# Patient Record
Sex: FEMALE | Race: WHITE | Employment: UNEMPLOYED | URBAN - NONMETROPOLITAN AREA
[De-identification: names, ages, dates, MRNs, and addresses within clinical notes are randomized per-mention and may not be internally consistent; named-entity substitution may affect disease eponyms.]

---

## 2019-01-01 ENCOUNTER — APPOINTMENT (OUTPATIENT)
Dept: CT IMAGING | Age: 84
DRG: 853 | End: 2019-01-01
Attending: INTERNAL MEDICINE
Payer: MEDICARE

## 2019-01-01 ENCOUNTER — ANESTHESIA EVENT (OUTPATIENT)
Dept: OPERATING ROOM | Age: 84
DRG: 853 | End: 2019-01-01
Payer: MEDICARE

## 2019-01-01 ENCOUNTER — ANESTHESIA (OUTPATIENT)
Dept: OPERATING ROOM | Age: 84
DRG: 853 | End: 2019-01-01
Payer: MEDICARE

## 2019-01-01 ENCOUNTER — APPOINTMENT (OUTPATIENT)
Dept: GENERAL RADIOLOGY | Age: 84
DRG: 853 | End: 2019-01-01
Attending: INTERNAL MEDICINE
Payer: MEDICARE

## 2019-01-01 ENCOUNTER — HOSPITAL ENCOUNTER (INPATIENT)
Age: 84
LOS: 1 days | DRG: 853 | End: 2019-06-03
Attending: INTERNAL MEDICINE | Admitting: INTERNAL MEDICINE
Payer: MEDICARE

## 2019-01-01 VITALS
RESPIRATION RATE: 2 BRPM | TEMPERATURE: 98.1 F | DIASTOLIC BLOOD PRESSURE: 101 MMHG | OXYGEN SATURATION: 98 % | SYSTOLIC BLOOD PRESSURE: 146 MMHG

## 2019-01-01 VITALS
BODY MASS INDEX: 33.13 KG/M2 | HEIGHT: 66 IN | HEART RATE: 115 BPM | WEIGHT: 206.13 LBS | OXYGEN SATURATION: 82 % | RESPIRATION RATE: 45 BRPM | SYSTOLIC BLOOD PRESSURE: 88 MMHG | DIASTOLIC BLOOD PRESSURE: 69 MMHG | TEMPERATURE: 96.1 F

## 2019-01-01 LAB
ADENOVIRUS F 40 41 PCR: NOT DETECTED
ALBUMIN SERPL-MCNC: 2.9 G/DL (ref 3.5–5.1)
ALLEN TEST: ABNORMAL
ALP BLD-CCNC: 44 U/L (ref 38–126)
ALT SERPL-CCNC: 11 U/L (ref 11–66)
AMORPHOUS: ABNORMAL
ANION GAP SERPL CALCULATED.3IONS-SCNC: 17 MEQ/L (ref 8–16)
ANION GAP SERPL CALCULATED.3IONS-SCNC: 18 MEQ/L (ref 8–16)
ANION GAP SERPL CALCULATED.3IONS-SCNC: 18 MEQ/L (ref 8–16)
ANION GAP SERPL CALCULATED.3IONS-SCNC: 25 MEQ/L (ref 8–16)
AST SERPL-CCNC: 21 U/L (ref 5–40)
ASTROVIRUS PCR: NOT DETECTED
AVERAGE GLUCOSE: 90 MG/DL (ref 70–126)
BACTERIA: ABNORMAL
BASE EXCESS (CALCULATED): -13.5 MMOL/L (ref -2.5–2.5)
BASOPHILS # BLD: 0 %
BASOPHILS # BLD: 0.3 %
BASOPHILS ABSOLUTE: 0 THOU/MM3 (ref 0–0.1)
BASOPHILS ABSOLUTE: 0.1 THOU/MM3 (ref 0–0.1)
BILIRUB SERPL-MCNC: 0.4 MG/DL (ref 0.3–1.2)
BILIRUBIN URINE: NEGATIVE
BLOOD, URINE: ABNORMAL
BUN BLDV-MCNC: 51 MG/DL (ref 7–22)
BUN BLDV-MCNC: 58 MG/DL (ref 7–22)
BUN BLDV-MCNC: 60 MG/DL (ref 7–22)
BUN BLDV-MCNC: 61 MG/DL (ref 7–22)
CALCIUM IONIZED: 0.96 MMOL/L (ref 1.12–1.32)
CALCIUM IONIZED: 0.96 MMOL/L (ref 1.12–1.32)
CALCIUM IONIZED: 0.99 MMOL/L (ref 1.12–1.32)
CALCIUM SERPL-MCNC: 7.2 MG/DL (ref 8.5–10.5)
CALCIUM SERPL-MCNC: 7.2 MG/DL (ref 8.5–10.5)
CALCIUM SERPL-MCNC: 7.4 MG/DL (ref 8.5–10.5)
CALCIUM SERPL-MCNC: 8.3 MG/DL (ref 8.5–10.5)
CAMPYLOBACTER PCR: NOT DETECTED
CASTS: ABNORMAL /LPF
CHARACTER, URINE: ABNORMAL
CHLORIDE BLD-SCNC: 104 MEQ/L (ref 98–111)
CHLORIDE BLD-SCNC: 106 MEQ/L (ref 98–111)
CHLORIDE BLD-SCNC: 106 MEQ/L (ref 98–111)
CHLORIDE BLD-SCNC: 108 MEQ/L (ref 98–111)
CLOSTRIDIUM DIFFICILE, PCR: NOT DETECTED
CO2: 14 MEQ/L (ref 23–33)
CO2: 14 MEQ/L (ref 23–33)
CO2: 16 MEQ/L (ref 23–33)
CO2: 9 MEQ/L (ref 23–33)
COLLECTED BY:: ABNORMAL
COLOR: YELLOW
CREAT SERPL-MCNC: 2.5 MG/DL (ref 0.4–1.2)
CREAT SERPL-MCNC: 3.2 MG/DL (ref 0.4–1.2)
CREAT SERPL-MCNC: 3.8 MG/DL (ref 0.4–1.2)
CREAT SERPL-MCNC: 4.2 MG/DL (ref 0.4–1.2)
CREATININE URINE: 72.4 MG/DL
CRYPTOSPORIDIUM PCR: NOT DETECTED
CRYSTALS: ABNORMAL
CYCLOSPORA CAYETANENSIS PCR: NOT DETECTED
DEVICE: ABNORMAL
DIFFERENTIAL TYPE: ABNORMAL
DIFFERENTIAL, MANUAL: NORMAL
E COLI 0157 PCR: ABNORMAL
E COLI ENTEROAGGREGATIVE PCR: NOT DETECTED
E COLI ENTEROPATHOGENIC PCR: DETECTED
E COLI ENTEROTOXIGENIC PCR: NOT DETECTED
E COLI SHIGA LIKE TOXIN PCR: NOT DETECTED
E COLI SHIGELLA/ENTEROINVASIVE PCR: NOT DETECTED
E HISTOLYTICA GI FILM ARRAY: NOT DETECTED
EOSINOPHIL # BLD: 0 %
EOSINOPHIL # BLD: 0 %
EOSINOPHILS ABSOLUTE: 0 THOU/MM3 (ref 0–0.4)
EOSINOPHILS ABSOLUTE: 0 THOU/MM3 (ref 0–0.4)
EPITHELIAL CELLS, UA: ABNORMAL /HPF
ERYTHROCYTE [DISTWIDTH] IN BLOOD BY AUTOMATED COUNT: 12.8 % (ref 11.5–14.5)
ERYTHROCYTE [DISTWIDTH] IN BLOOD BY AUTOMATED COUNT: 13.1 % (ref 11.5–14.5)
ERYTHROCYTE [DISTWIDTH] IN BLOOD BY AUTOMATED COUNT: 13.1 % (ref 11.5–14.5)
ERYTHROCYTE [DISTWIDTH] IN BLOOD BY AUTOMATED COUNT: 48.5 FL (ref 35–45)
ERYTHROCYTE [DISTWIDTH] IN BLOOD BY AUTOMATED COUNT: 50.2 FL (ref 35–45)
ERYTHROCYTE [DISTWIDTH] IN BLOOD BY AUTOMATED COUNT: 53.9 FL (ref 35–45)
FOLATE: 16.6 NG/ML (ref 4.8–24.2)
GFR SERPL CREATININE-BSD FRML MDRD: 10 ML/MIN/1.73M2
GFR SERPL CREATININE-BSD FRML MDRD: 11 ML/MIN/1.73M2
GFR SERPL CREATININE-BSD FRML MDRD: 14 ML/MIN/1.73M2
GFR SERPL CREATININE-BSD FRML MDRD: 18 ML/MIN/1.73M2
GIARDIA LAMBLIA PCR: NOT DETECTED
GLUCOSE BLD-MCNC: 158 MG/DL (ref 70–108)
GLUCOSE BLD-MCNC: 169 MG/DL (ref 70–108)
GLUCOSE BLD-MCNC: 174 MG/DL (ref 70–108)
GLUCOSE BLD-MCNC: 185 MG/DL (ref 70–108)
GLUCOSE BLD-MCNC: 185 MG/DL (ref 70–108)
GLUCOSE BLD-MCNC: 192 MG/DL (ref 70–108)
GLUCOSE BLD-MCNC: 235 MG/DL (ref 70–108)
GLUCOSE, URINE: NEGATIVE MG/DL
GLUCOSE, WHOLE BLOOD: 263 MG/DL (ref 70–108)
HBA1C MFR BLD: 5 % (ref 4.4–6.4)
HCO3: 11 MMOL/L (ref 23–28)
HCT VFR BLD CALC: 46.1 % (ref 37–47)
HCT VFR BLD CALC: 50.7 % (ref 37–47)
HCT VFR BLD CALC: 51.7 % (ref 37–47)
HCT VFR BLD CALC: 54.8 % (ref 37–47)
HEMOGLOBIN: 14.6 GM/DL (ref 12–16)
HEMOGLOBIN: 16.3 GM/DL (ref 12–16)
HEMOGLOBIN: 16.4 GM/DL (ref 12–16)
HEMOGLOBIN: 16.9 GM/DL (ref 12–16)
IFIO2: 4
IMMATURE GRANS (ABS): 0.56 THOU/MM3 (ref 0–0.07)
IMMATURE GRANULOCYTES: 2.4 %
KETONES, URINE: NEGATIVE
LACTIC ACID, SEPSIS: 3.1 MMOL/L (ref 0.5–1.9)
LACTIC ACID, SEPSIS: 4 MMOL/L (ref 0.5–1.9)
LACTIC ACID: 3 MMOL/L (ref 0.5–2.2)
LACTIC ACID: 3.1 MMOL/L (ref 0.5–2.2)
LACTIC ACID: 3.7 MMOL/L (ref 0.5–2.2)
LACTIC ACID: 4.6 MMOL/L (ref 0.5–2.2)
LACTIC ACID: 4.7 MMOL/L (ref 0.5–2.2)
LACTIC ACID: 5.4 MMOL/L (ref 0.5–2.2)
LACTIC ACID: 9.2 MMOL/L (ref 0.5–2.2)
LEUKOCYTE ESTERASE, URINE: ABNORMAL
LYMPHOCYTES # BLD: 3 %
LYMPHOCYTES # BLD: 4.5 %
LYMPHOCYTES ABSOLUTE: 0.8 THOU/MM3 (ref 1–4.8)
LYMPHOCYTES ABSOLUTE: 1.1 THOU/MM3 (ref 1–4.8)
MACROCYTES: PRESENT
MAGNESIUM: 2 MG/DL (ref 1.6–2.4)
MCH RBC QN AUTO: 33.1 PG (ref 26–33)
MCH RBC QN AUTO: 33.2 PG (ref 26–33)
MCH RBC QN AUTO: 34.1 PG (ref 26–33)
MCHC RBC AUTO-ENTMCNC: 30.8 GM/DL (ref 32.2–35.5)
MCHC RBC AUTO-ENTMCNC: 31.7 GM/DL (ref 32.2–35.5)
MCHC RBC AUTO-ENTMCNC: 32.1 GM/DL (ref 32.2–35.5)
MCV RBC AUTO: 103 FL (ref 81–99)
MCV RBC AUTO: 104.8 FL (ref 81–99)
MCV RBC AUTO: 110.7 FL (ref 81–99)
MONOCYTES # BLD: 19 %
MONOCYTES # BLD: 7.9 %
MONOCYTES ABSOLUTE: 1.9 THOU/MM3 (ref 0.4–1.3)
MONOCYTES ABSOLUTE: 4.8 THOU/MM3 (ref 0.4–1.3)
MRSA SCREEN RT-PCR: NEGATIVE
NITRITE, URINE: NEGATIVE
NOROVIRUS GI GII PCR: NOT DETECTED
NUCLEATED RED BLOOD CELLS: 0 /100 WBC
NUCLEATED RED BLOOD CELLS: 0 /100 WBC
O2 SATURATION: 95 %
PATHOLOGIST REVIEW: ABNORMAL
PCO2: 23 MMHG (ref 35–45)
PH BLOOD GAS: 7.28 (ref 7.35–7.45)
PH UA: 5.5 (ref 5–9)
PHOSPHORUS: 6.2 MG/DL (ref 2.4–4.7)
PLATELET # BLD: 248 THOU/MM3 (ref 130–400)
PLATELET # BLD: 288 THOU/MM3 (ref 130–400)
PLATELET # BLD: 290 THOU/MM3 (ref 130–400)
PLESIOMONAS SHIGELLOIDES PCR: NOT DETECTED
PMV BLD AUTO: 10 FL (ref 9.4–12.4)
PMV BLD AUTO: 10 FL (ref 9.4–12.4)
PMV BLD AUTO: 9.8 FL (ref 9.4–12.4)
PO2: 82 MMHG (ref 71–104)
POTASSIUM REFLEX MAGNESIUM: 4.9 MEQ/L (ref 3.5–5.2)
POTASSIUM REFLEX MAGNESIUM: 5 MEQ/L (ref 3.5–5.2)
POTASSIUM SERPL-SCNC: 4.8 MEQ/L (ref 3.5–5.2)
POTASSIUM SERPL-SCNC: 5.9 MEQ/L (ref 3.5–5.2)
PROTEIN UA: >= 300 MG/DL
RBC # BLD: 4.4 MILL/MM3 (ref 4.2–5.4)
RBC # BLD: 4.92 MILL/MM3 (ref 4.2–5.4)
RBC # BLD: 4.95 MILL/MM3 (ref 4.2–5.4)
RBC URINE: ABNORMAL /HPF
RENAL EPITHELIAL, UA: ABNORMAL
ROTAVIRUS A PCR: NOT DETECTED
SALMONELLA PCR: NOT DETECTED
SAPOVIRUS PCR: NOT DETECTED
SEG NEUTROPHILS: 78 %
SEG NEUTROPHILS: 84.9 %
SEGMENTED NEUTROPHILS ABSOLUTE COUNT: 19.9 THOU/MM3 (ref 1.8–7.7)
SEGMENTED NEUTROPHILS ABSOLUTE COUNT: 20.1 THOU/MM3 (ref 1.8–7.7)
SODIUM BLD-SCNC: 136 MEQ/L (ref 135–145)
SODIUM BLD-SCNC: 137 MEQ/L (ref 135–145)
SODIUM BLD-SCNC: 140 MEQ/L (ref 135–145)
SODIUM BLD-SCNC: 142 MEQ/L (ref 135–145)
SODIUM URINE: 54 MEQ/L
SOURCE, BLOOD GAS: ABNORMAL
SPECIFIC GRAVITY UA: 1.02 (ref 1–1.03)
TOTAL PROTEIN: 5.1 G/DL (ref 6.1–8)
UROBILINOGEN, URINE: 0.2 EU/DL (ref 0–1)
VANCOMYCIN RESISTANT ENTEROCOCCUS: NEGATIVE
VIBRIO CHOLERAE PCR: NOT DETECTED
VIBRIO PCR: NOT DETECTED
VITAMIN B-12: 360 PG/ML (ref 211–911)
WBC # BLD: 23 THOU/MM3 (ref 4.8–10.8)
WBC # BLD: 23.7 THOU/MM3 (ref 4.8–10.8)
WBC # BLD: 25.5 THOU/MM3 (ref 4.8–10.8)
WBC UA: ABNORMAL /HPF
YEAST: ABNORMAL
YERSINIA ENTEROCOLITICA PCR: NOT DETECTED

## 2019-01-01 PROCEDURE — 82803 BLOOD GASES ANY COMBINATION: CPT

## 2019-01-01 PROCEDURE — P9045 ALBUMIN (HUMAN), 5%, 250 ML: HCPCS | Performed by: REGISTERED NURSE

## 2019-01-01 PROCEDURE — 87507 IADNA-DNA/RNA PROBE TQ 12-25: CPT

## 2019-01-01 PROCEDURE — 2580000003 HC RX 258: Performed by: INTERNAL MEDICINE

## 2019-01-01 PROCEDURE — 99223 1ST HOSP IP/OBS HIGH 75: CPT | Performed by: INTERNAL MEDICINE

## 2019-01-01 PROCEDURE — 82607 VITAMIN B-12: CPT

## 2019-01-01 PROCEDURE — 6360000002 HC RX W HCPCS: Performed by: SURGERY

## 2019-01-01 PROCEDURE — 6370000000 HC RX 637 (ALT 250 FOR IP): Performed by: NURSE PRACTITIONER

## 2019-01-01 PROCEDURE — 81001 URINALYSIS AUTO W/SCOPE: CPT

## 2019-01-01 PROCEDURE — 85025 COMPLETE CBC W/AUTO DIFF WBC: CPT

## 2019-01-01 PROCEDURE — 36415 COLL VENOUS BLD VENIPUNCTURE: CPT

## 2019-01-01 PROCEDURE — 6370000000 HC RX 637 (ALT 250 FOR IP): Performed by: SURGERY

## 2019-01-01 PROCEDURE — 3700000000 HC ANESTHESIA ATTENDED CARE: Performed by: SURGERY

## 2019-01-01 PROCEDURE — 2709999900 HC NON-CHARGEABLE SUPPLY

## 2019-01-01 PROCEDURE — 6360000002 HC RX W HCPCS: Performed by: INTERNAL MEDICINE

## 2019-01-01 PROCEDURE — 82948 REAGENT STRIP/BLOOD GLUCOSE: CPT

## 2019-01-01 PROCEDURE — 99291 CRITICAL CARE FIRST HOUR: CPT | Performed by: INTERNAL MEDICINE

## 2019-01-01 PROCEDURE — 6370000000 HC RX 637 (ALT 250 FOR IP): Performed by: INTERNAL MEDICINE

## 2019-01-01 PROCEDURE — 3700000001 HC ADD 15 MINUTES (ANESTHESIA): Performed by: SURGERY

## 2019-01-01 PROCEDURE — 88342 IMHCHEM/IMCYTCHM 1ST ANTB: CPT

## 2019-01-01 PROCEDURE — 84100 ASSAY OF PHOSPHORUS: CPT

## 2019-01-01 PROCEDURE — 87075 CULTR BACTERIA EXCEPT BLOOD: CPT

## 2019-01-01 PROCEDURE — 7100000000 HC PACU RECOVERY - FIRST 15 MIN: Performed by: SURGERY

## 2019-01-01 PROCEDURE — 87040 BLOOD CULTURE FOR BACTERIA: CPT

## 2019-01-01 PROCEDURE — P9047 ALBUMIN (HUMAN), 25%, 50ML: HCPCS | Performed by: INTERNAL MEDICINE

## 2019-01-01 PROCEDURE — 83735 ASSAY OF MAGNESIUM: CPT

## 2019-01-01 PROCEDURE — 85014 HEMATOCRIT: CPT

## 2019-01-01 PROCEDURE — 71045 X-RAY EXAM CHEST 1 VIEW: CPT

## 2019-01-01 PROCEDURE — 2500000003 HC RX 250 WO HCPCS: Performed by: INTERNAL MEDICINE

## 2019-01-01 PROCEDURE — 2720000010 HC SURG SUPPLY STERILE: Performed by: SURGERY

## 2019-01-01 PROCEDURE — 99223 1ST HOSP IP/OBS HIGH 75: CPT | Performed by: SURGERY

## 2019-01-01 PROCEDURE — 02HV33Z INSERTION OF INFUSION DEVICE INTO SUPERIOR VENA CAVA, PERCUTANEOUS APPROACH: ICD-10-PCS | Performed by: INTERNAL MEDICINE

## 2019-01-01 PROCEDURE — 80048 BASIC METABOLIC PNL TOTAL CA: CPT

## 2019-01-01 PROCEDURE — 87500 VANOMYCIN DNA AMP PROBE: CPT

## 2019-01-01 PROCEDURE — 88309 TISSUE EXAM BY PATHOLOGIST: CPT

## 2019-01-01 PROCEDURE — 2500000003 HC RX 250 WO HCPCS

## 2019-01-01 PROCEDURE — 87081 CULTURE SCREEN ONLY: CPT

## 2019-01-01 PROCEDURE — 2780000010 HC IMPLANT OTHER: Performed by: SURGERY

## 2019-01-01 PROCEDURE — 84300 ASSAY OF URINE SODIUM: CPT

## 2019-01-01 PROCEDURE — 0DBF0ZZ EXCISION OF RIGHT LARGE INTESTINE, OPEN APPROACH: ICD-10-PCS | Performed by: SURGERY

## 2019-01-01 PROCEDURE — 83605 ASSAY OF LACTIC ACID: CPT

## 2019-01-01 PROCEDURE — 2700000000 HC OXYGEN THERAPY PER DAY

## 2019-01-01 PROCEDURE — 44160 REMOVAL OF COLON: CPT | Performed by: SURGERY

## 2019-01-01 PROCEDURE — 2580000003 HC RX 258: Performed by: SURGERY

## 2019-01-01 PROCEDURE — 85027 COMPLETE CBC AUTOMATED: CPT

## 2019-01-01 PROCEDURE — 82746 ASSAY OF FOLIC ACID SERUM: CPT

## 2019-01-01 PROCEDURE — 82570 ASSAY OF URINE CREATININE: CPT

## 2019-01-01 PROCEDURE — 2000000000 HC ICU R&B

## 2019-01-01 PROCEDURE — 3600000014 HC SURGERY LEVEL 4 ADDTL 15MIN: Performed by: SURGERY

## 2019-01-01 PROCEDURE — 2580000003 HC RX 258: Performed by: NURSE PRACTITIONER

## 2019-01-01 PROCEDURE — 36600 WITHDRAWAL OF ARTERIAL BLOOD: CPT

## 2019-01-01 PROCEDURE — 6360000002 HC RX W HCPCS: Performed by: NURSE PRACTITIONER

## 2019-01-01 PROCEDURE — 80053 COMPREHEN METABOLIC PANEL: CPT

## 2019-01-01 PROCEDURE — 2500000003 HC RX 250 WO HCPCS: Performed by: SURGERY

## 2019-01-01 PROCEDURE — 2500000003 HC RX 250 WO HCPCS: Performed by: REGISTERED NURSE

## 2019-01-01 PROCEDURE — 85018 HEMOGLOBIN: CPT

## 2019-01-01 PROCEDURE — 36556 INSERT NON-TUNNEL CV CATH: CPT

## 2019-01-01 PROCEDURE — 7100000001 HC PACU RECOVERY - ADDTL 15 MIN: Performed by: SURGERY

## 2019-01-01 PROCEDURE — C1751 CATH, INF, PER/CENT/MIDLINE: HCPCS

## 2019-01-01 PROCEDURE — 3600000004 HC SURGERY LEVEL 4 BASE: Performed by: SURGERY

## 2019-01-01 PROCEDURE — 3209999900 CT INTERPRETATION OF OUTSIDE IMAGES

## 2019-01-01 PROCEDURE — 82330 ASSAY OF CALCIUM: CPT

## 2019-01-01 PROCEDURE — 36556 INSERT NON-TUNNEL CV CATH: CPT | Performed by: INTERNAL MEDICINE

## 2019-01-01 PROCEDURE — 82947 ASSAY GLUCOSE BLOOD QUANT: CPT

## 2019-01-01 PROCEDURE — 6360000002 HC RX W HCPCS: Performed by: REGISTERED NURSE

## 2019-01-01 PROCEDURE — 88341 IMHCHEM/IMCYTCHM EA ADD ANTB: CPT

## 2019-01-01 PROCEDURE — 87641 MR-STAPH DNA AMP PROBE: CPT

## 2019-01-01 PROCEDURE — 99024 POSTOP FOLLOW-UP VISIT: CPT | Performed by: SURGERY

## 2019-01-01 PROCEDURE — 87205 SMEAR GRAM STAIN: CPT

## 2019-01-01 PROCEDURE — 87070 CULTURE OTHR SPECIMN AEROBIC: CPT

## 2019-01-01 PROCEDURE — 94760 N-INVAS EAR/PLS OXIMETRY 1: CPT

## 2019-01-01 PROCEDURE — C1729 CATH, DRAINAGE: HCPCS | Performed by: SURGERY

## 2019-01-01 PROCEDURE — 83036 HEMOGLOBIN GLYCOSYLATED A1C: CPT

## 2019-01-01 PROCEDURE — 2709999900 HC NON-CHARGEABLE SUPPLY: Performed by: SURGERY

## 2019-01-01 DEVICE — SEALANT HEMSTAT 5ML HUM FIBRIN THROM 2 VI APPL DEV EVICEL: Type: IMPLANTABLE DEVICE | Status: FUNCTIONAL

## 2019-01-01 RX ORDER — ACETAMINOPHEN 325 MG/1
650 TABLET ORAL EVERY 4 HOURS PRN
Status: DISCONTINUED | OUTPATIENT
Start: 2019-01-01 | End: 2019-06-04 | Stop reason: HOSPADM

## 2019-01-01 RX ORDER — HEPARIN SODIUM 5000 [USP'U]/ML
5000 INJECTION, SOLUTION INTRAVENOUS; SUBCUTANEOUS EVERY 8 HOURS
Status: DISCONTINUED | OUTPATIENT
Start: 2019-01-01 | End: 2019-01-01

## 2019-01-01 RX ORDER — HYDROMORPHONE HCL 110MG/55ML
PATIENT CONTROLLED ANALGESIA SYRINGE INTRAVENOUS PRN
Status: DISCONTINUED | OUTPATIENT
Start: 2019-01-01 | End: 2019-01-01 | Stop reason: SDUPTHER

## 2019-01-01 RX ORDER — BUPIVACAINE HYDROCHLORIDE AND EPINEPHRINE 2.5; 5 MG/ML; UG/ML
INJECTION, SOLUTION EPIDURAL; INFILTRATION; INTRACAUDAL; PERINEURAL PRN
Status: DISCONTINUED | OUTPATIENT
Start: 2019-01-01 | End: 2019-01-01 | Stop reason: HOSPADM

## 2019-01-01 RX ORDER — SODIUM CHLORIDE 0.9 % (FLUSH) 0.9 %
10 SYRINGE (ML) INJECTION EVERY 12 HOURS SCHEDULED
Status: DISCONTINUED | OUTPATIENT
Start: 2019-01-01 | End: 2019-01-01 | Stop reason: SDUPTHER

## 2019-01-01 RX ORDER — LIDOCAINE HYDROCHLORIDE 20 MG/ML
INJECTION, SOLUTION INTRAVENOUS PRN
Status: DISCONTINUED | OUTPATIENT
Start: 2019-01-01 | End: 2019-01-01 | Stop reason: SDUPTHER

## 2019-01-01 RX ORDER — SODIUM CHLORIDE, SODIUM LACTATE, POTASSIUM CHLORIDE, AND CALCIUM CHLORIDE .6; .31; .03; .02 G/100ML; G/100ML; G/100ML; G/100ML
1000 INJECTION, SOLUTION INTRAVENOUS ONCE
Status: COMPLETED | OUTPATIENT
Start: 2019-01-01 | End: 2019-01-01

## 2019-01-01 RX ORDER — ONDANSETRON 2 MG/ML
INJECTION INTRAMUSCULAR; INTRAVENOUS PRN
Status: DISCONTINUED | OUTPATIENT
Start: 2019-01-01 | End: 2019-01-01 | Stop reason: SDUPTHER

## 2019-01-01 RX ORDER — CARVEDILOL 3.12 MG/1
3.12 TABLET ORAL 2 TIMES DAILY WITH MEALS
COMMUNITY

## 2019-01-01 RX ORDER — SENNOSIDES 8.6 MG
650 CAPSULE ORAL EVERY 8 HOURS PRN
COMMUNITY

## 2019-01-01 RX ORDER — 0.9 % SODIUM CHLORIDE 0.9 %
1000 INTRAVENOUS SOLUTION INTRAVENOUS ONCE
Status: COMPLETED | OUTPATIENT
Start: 2019-01-01 | End: 2019-01-01

## 2019-01-01 RX ORDER — MORPHINE SULFATE 2 MG/ML
2 INJECTION, SOLUTION INTRAMUSCULAR; INTRAVENOUS EVERY 5 MIN PRN
Status: DISCONTINUED | OUTPATIENT
Start: 2019-01-01 | End: 2019-01-01 | Stop reason: HOSPADM

## 2019-01-01 RX ORDER — FENTANYL CITRATE 50 UG/ML
50 INJECTION, SOLUTION INTRAMUSCULAR; INTRAVENOUS EVERY 5 MIN PRN
Status: DISCONTINUED | OUTPATIENT
Start: 2019-01-01 | End: 2019-01-01 | Stop reason: HOSPADM

## 2019-01-01 RX ORDER — POTASSIUM CHLORIDE 20 MEQ/1
40 TABLET, EXTENDED RELEASE ORAL PRN
Status: DISCONTINUED | OUTPATIENT
Start: 2019-01-01 | End: 2019-01-01

## 2019-01-01 RX ORDER — DEXAMETHASONE SODIUM PHOSPHATE 4 MG/ML
2 INJECTION, SOLUTION INTRA-ARTICULAR; INTRALESIONAL; INTRAMUSCULAR; INTRAVENOUS; SOFT TISSUE DAILY
Status: DISCONTINUED | OUTPATIENT
Start: 2019-01-01 | End: 2019-01-01

## 2019-01-01 RX ORDER — EZETIMIBE 10 MG/1
10 TABLET ORAL DAILY
COMMUNITY

## 2019-01-01 RX ORDER — TRAMADOL HYDROCHLORIDE 50 MG/1
100 TABLET ORAL 3 TIMES DAILY PRN
COMMUNITY

## 2019-01-01 RX ORDER — DEXAMETHASONE SODIUM PHOSPHATE 4 MG/ML
INJECTION, SOLUTION INTRA-ARTICULAR; INTRALESIONAL; INTRAMUSCULAR; INTRAVENOUS; SOFT TISSUE PRN
Status: DISCONTINUED | OUTPATIENT
Start: 2019-01-01 | End: 2019-01-01 | Stop reason: SDUPTHER

## 2019-01-01 RX ORDER — POTASSIUM CHLORIDE 7.45 MG/ML
10 INJECTION INTRAVENOUS PRN
Status: DISCONTINUED | OUTPATIENT
Start: 2019-01-01 | End: 2019-01-01

## 2019-01-01 RX ORDER — QUINIDINE GLUCONATE 324 MG
225 TABLET, EXTENDED RELEASE ORAL DAILY
Status: DISCONTINUED | OUTPATIENT
Start: 2019-01-01 | End: 2019-01-01

## 2019-01-01 RX ORDER — DIPHENHYDRAMINE HYDROCHLORIDE 50 MG/ML
12.5 INJECTION INTRAMUSCULAR; INTRAVENOUS
Status: DISCONTINUED | OUTPATIENT
Start: 2019-01-01 | End: 2019-01-01 | Stop reason: HOSPADM

## 2019-01-01 RX ORDER — ROSUVASTATIN CALCIUM 40 MG/1
40 TABLET, COATED ORAL NIGHTLY
COMMUNITY

## 2019-01-01 RX ORDER — ACETAMINOPHEN 325 MG/1
650 TABLET ORAL EVERY 8 HOURS PRN
Status: DISCONTINUED | OUTPATIENT
Start: 2019-01-01 | End: 2019-01-01 | Stop reason: SDUPTHER

## 2019-01-01 RX ORDER — ALBUMIN, HUMAN INJ 5% 5 %
SOLUTION INTRAVENOUS PRN
Status: DISCONTINUED | OUTPATIENT
Start: 2019-01-01 | End: 2019-01-01 | Stop reason: SDUPTHER

## 2019-01-01 RX ORDER — SODIUM CHLORIDE 0.9 % (FLUSH) 0.9 %
10 SYRINGE (ML) INJECTION PRN
Status: DISCONTINUED | OUTPATIENT
Start: 2019-01-01 | End: 2019-06-04 | Stop reason: HOSPADM

## 2019-01-01 RX ORDER — OXYCODONE HYDROCHLORIDE AND ACETAMINOPHEN 5; 325 MG/1; MG/1
1 TABLET ORAL EVERY 4 HOURS PRN
Status: DISCONTINUED | OUTPATIENT
Start: 2019-01-01 | End: 2019-06-04 | Stop reason: HOSPADM

## 2019-01-01 RX ORDER — POTASSIUM CHLORIDE 750 MG/1
10 CAPSULE, EXTENDED RELEASE ORAL DAILY
COMMUNITY

## 2019-01-01 RX ORDER — NEOSTIGMINE METHYLSULFATE 5 MG/5 ML
SYRINGE (ML) INTRAVENOUS PRN
Status: DISCONTINUED | OUTPATIENT
Start: 2019-01-01 | End: 2019-01-01 | Stop reason: SDUPTHER

## 2019-01-01 RX ORDER — FUROSEMIDE 20 MG/1
20 TABLET ORAL DAILY
COMMUNITY

## 2019-01-01 RX ORDER — ONDANSETRON 2 MG/ML
4 INJECTION INTRAMUSCULAR; INTRAVENOUS EVERY 6 HOURS PRN
Status: DISCONTINUED | OUTPATIENT
Start: 2019-01-01 | End: 2019-06-04 | Stop reason: HOSPADM

## 2019-01-01 RX ORDER — SODIUM CHLORIDE 9 MG/ML
INJECTION, SOLUTION INTRAVENOUS CONTINUOUS
Status: DISCONTINUED | OUTPATIENT
Start: 2019-01-01 | End: 2019-01-01

## 2019-01-01 RX ORDER — HYDRALAZINE HYDROCHLORIDE 20 MG/ML
5 INJECTION INTRAMUSCULAR; INTRAVENOUS EVERY 10 MIN PRN
Status: DISCONTINUED | OUTPATIENT
Start: 2019-01-01 | End: 2019-01-01 | Stop reason: HOSPADM

## 2019-01-01 RX ORDER — POTASSIUM CHLORIDE 750 MG/1
10 TABLET, FILM COATED, EXTENDED RELEASE ORAL DAILY
Status: DISCONTINUED | OUTPATIENT
Start: 2019-01-01 | End: 2019-01-01

## 2019-01-01 RX ORDER — ROSUVASTATIN CALCIUM 20 MG/1
40 TABLET, COATED ORAL NIGHTLY
Status: DISCONTINUED | OUTPATIENT
Start: 2019-01-01 | End: 2019-01-01

## 2019-01-01 RX ORDER — ASPIRIN 81 MG/1
81 TABLET ORAL DAILY
Status: DISCONTINUED | OUTPATIENT
Start: 2019-01-01 | End: 2019-01-01

## 2019-01-01 RX ORDER — SODIUM CHLORIDE 0.9 % (FLUSH) 0.9 %
10 SYRINGE (ML) INJECTION PRN
Status: DISCONTINUED | OUTPATIENT
Start: 2019-01-01 | End: 2019-01-01 | Stop reason: SDUPTHER

## 2019-01-01 RX ORDER — MORPHINE SULFATE/PF 50 MG/50ML
PATIENT CONTROLLED ANALGESIA SYRINGE INTRAVENOUS CONTINUOUS
Status: DISCONTINUED | OUTPATIENT
Start: 2019-01-01 | End: 2019-01-01

## 2019-01-01 RX ORDER — SUCCINYLCHOLINE/SOD CL,ISO/PF 200MG/10ML
SYRINGE (ML) INTRAVENOUS PRN
Status: DISCONTINUED | OUTPATIENT
Start: 2019-01-01 | End: 2019-01-01 | Stop reason: SDUPTHER

## 2019-01-01 RX ORDER — DEXTROSE MONOHYDRATE 50 MG/ML
100 INJECTION, SOLUTION INTRAVENOUS PRN
Status: DISCONTINUED | OUTPATIENT
Start: 2019-01-01 | End: 2019-06-04 | Stop reason: HOSPADM

## 2019-01-01 RX ORDER — ONDANSETRON 2 MG/ML
4 INJECTION INTRAMUSCULAR; INTRAVENOUS
Status: DISCONTINUED | OUTPATIENT
Start: 2019-01-01 | End: 2019-01-01 | Stop reason: HOSPADM

## 2019-01-01 RX ORDER — ROCURONIUM BROMIDE 10 MG/ML
INJECTION, SOLUTION INTRAVENOUS PRN
Status: DISCONTINUED | OUTPATIENT
Start: 2019-01-01 | End: 2019-01-01 | Stop reason: SDUPTHER

## 2019-01-01 RX ORDER — LABETALOL HYDROCHLORIDE 5 MG/ML
5 INJECTION, SOLUTION INTRAVENOUS EVERY 5 MIN PRN
Status: DISCONTINUED | OUTPATIENT
Start: 2019-01-01 | End: 2019-01-01 | Stop reason: HOSPADM

## 2019-01-01 RX ORDER — CLOPIDOGREL BISULFATE 75 MG/1
75 TABLET ORAL EVERY MORNING
Status: DISCONTINUED | OUTPATIENT
Start: 2019-01-01 | End: 2019-01-01

## 2019-01-01 RX ORDER — TRAMADOL HYDROCHLORIDE 50 MG/1
100 TABLET ORAL EVERY 8 HOURS PRN
Status: DISCONTINUED | OUTPATIENT
Start: 2019-01-01 | End: 2019-01-01

## 2019-01-01 RX ORDER — 0.9 % SODIUM CHLORIDE 0.9 %
500 INTRAVENOUS SOLUTION INTRAVENOUS ONCE
Status: COMPLETED | OUTPATIENT
Start: 2019-01-01 | End: 2019-01-01

## 2019-01-01 RX ORDER — DEXAMETHASONE SODIUM PHOSPHATE 4 MG/ML
10 INJECTION, SOLUTION INTRA-ARTICULAR; INTRALESIONAL; INTRAMUSCULAR; INTRAVENOUS; SOFT TISSUE ONCE
Status: COMPLETED | OUTPATIENT
Start: 2019-01-01 | End: 2019-01-01

## 2019-01-01 RX ORDER — CLOPIDOGREL BISULFATE 75 MG/1
75 TABLET ORAL EVERY MORNING
COMMUNITY

## 2019-01-01 RX ORDER — POTASSIUM CHLORIDE 7.45 MG/ML
10 INJECTION INTRAVENOUS PRN
Status: DISCONTINUED | OUTPATIENT
Start: 2019-01-01 | End: 2019-06-04 | Stop reason: HOSPADM

## 2019-01-01 RX ORDER — OXYCODONE HYDROCHLORIDE AND ACETAMINOPHEN 5; 325 MG/1; MG/1
2 TABLET ORAL EVERY 4 HOURS PRN
Status: DISCONTINUED | OUTPATIENT
Start: 2019-01-01 | End: 2019-06-04 | Stop reason: HOSPADM

## 2019-01-01 RX ORDER — ONDANSETRON 2 MG/ML
4 INJECTION INTRAMUSCULAR; INTRAVENOUS EVERY 6 HOURS PRN
Status: DISCONTINUED | OUTPATIENT
Start: 2019-01-01 | End: 2019-01-01 | Stop reason: SDUPTHER

## 2019-01-01 RX ORDER — DEXTROSE MONOHYDRATE 25 G/50ML
12.5 INJECTION, SOLUTION INTRAVENOUS PRN
Status: DISCONTINUED | OUTPATIENT
Start: 2019-01-01 | End: 2019-06-04 | Stop reason: HOSPADM

## 2019-01-01 RX ORDER — NICOTINE POLACRILEX 4 MG
15 LOZENGE BUCCAL PRN
Status: DISCONTINUED | OUTPATIENT
Start: 2019-01-01 | End: 2019-06-04 | Stop reason: HOSPADM

## 2019-01-01 RX ORDER — CARVEDILOL 3.12 MG/1
3.12 TABLET ORAL 2 TIMES DAILY WITH MEALS
Status: DISCONTINUED | OUTPATIENT
Start: 2019-01-01 | End: 2019-01-01

## 2019-01-01 RX ORDER — ETOMIDATE 2 MG/ML
INJECTION INTRAVENOUS PRN
Status: DISCONTINUED | OUTPATIENT
Start: 2019-01-01 | End: 2019-01-01 | Stop reason: SDUPTHER

## 2019-01-01 RX ORDER — MORPHINE SULFATE 2 MG/ML
2 INJECTION, SOLUTION INTRAMUSCULAR; INTRAVENOUS
Status: DISCONTINUED | OUTPATIENT
Start: 2019-01-01 | End: 2019-01-01

## 2019-01-01 RX ORDER — SODIUM CHLORIDE 0.9 % (FLUSH) 0.9 %
10 SYRINGE (ML) INJECTION EVERY 12 HOURS SCHEDULED
Status: DISCONTINUED | OUTPATIENT
Start: 2019-01-01 | End: 2019-01-01

## 2019-01-01 RX ORDER — MEPERIDINE HYDROCHLORIDE 25 MG/ML
12.5 INJECTION INTRAMUSCULAR; INTRAVENOUS; SUBCUTANEOUS EVERY 5 MIN PRN
Status: DISCONTINUED | OUTPATIENT
Start: 2019-01-01 | End: 2019-01-01 | Stop reason: HOSPADM

## 2019-01-01 RX ORDER — SODIUM CHLORIDE, SODIUM LACTATE, POTASSIUM CHLORIDE, CALCIUM CHLORIDE 600; 310; 30; 20 MG/100ML; MG/100ML; MG/100ML; MG/100ML
INJECTION, SOLUTION INTRAVENOUS CONTINUOUS
Status: DISCONTINUED | OUTPATIENT
Start: 2019-01-01 | End: 2019-01-01

## 2019-01-01 RX ORDER — ALBUMIN (HUMAN) 12.5 G/50ML
25 SOLUTION INTRAVENOUS EVERY 6 HOURS
Status: DISCONTINUED | OUTPATIENT
Start: 2019-01-01 | End: 2019-01-01

## 2019-01-01 RX ORDER — GLYCOPYRROLATE 1 MG/5 ML
SYRINGE (ML) INTRAVENOUS PRN
Status: DISCONTINUED | OUTPATIENT
Start: 2019-01-01 | End: 2019-01-01 | Stop reason: SDUPTHER

## 2019-01-01 RX ADMIN — INSULIN LISPRO 1 UNITS: 100 INJECTION, SOLUTION INTRAVENOUS; SUBCUTANEOUS at 06:42

## 2019-01-01 RX ADMIN — HYDROMORPHONE HYDROCHLORIDE 0.5 MG: 1 INJECTION, SOLUTION INTRAMUSCULAR; INTRAVENOUS; SUBCUTANEOUS at 17:04

## 2019-01-01 RX ADMIN — Medication 5 MCG/MIN: at 04:00

## 2019-01-01 RX ADMIN — INSULIN LISPRO 1 UNITS: 100 INJECTION, SOLUTION INTRAVENOUS; SUBCUTANEOUS at 19:01

## 2019-01-01 RX ADMIN — Medication 5 MCG/MIN: at 04:05

## 2019-01-01 RX ADMIN — Medication 200 MG: at 14:15

## 2019-01-01 RX ADMIN — Medication 0.8 MG: at 15:09

## 2019-01-01 RX ADMIN — CALCIUM GLUCONATE 1.5 G: 98 INJECTION, SOLUTION INTRAVENOUS at 06:52

## 2019-01-01 RX ADMIN — ALBUMIN (HUMAN) 25 G: 0.25 INJECTION, SOLUTION INTRAVENOUS at 14:10

## 2019-01-01 RX ADMIN — VASOPRESSIN 0.04 UNITS/MIN: 20 INJECTION INTRAVENOUS at 09:15

## 2019-01-01 RX ADMIN — SODIUM CHLORIDE: 9 INJECTION, SOLUTION INTRAVENOUS at 03:20

## 2019-01-01 RX ADMIN — PHENYLEPHRINE HYDROCHLORIDE 200 MCG: 10 INJECTION INTRAVENOUS at 14:24

## 2019-01-01 RX ADMIN — PIPERACILLIN AND TAZOBACTAM 3.38 G: 3; .375 INJECTION, POWDER, FOR SOLUTION INTRAVENOUS at 04:37

## 2019-01-01 RX ADMIN — SODIUM CHLORIDE 1000 ML: 9 INJECTION, SOLUTION INTRAVENOUS at 04:10

## 2019-01-01 RX ADMIN — HEPARIN SODIUM 5000 UNITS: 5000 INJECTION, SOLUTION INTRAVENOUS; SUBCUTANEOUS at 11:51

## 2019-01-01 RX ADMIN — SODIUM CHLORIDE, POTASSIUM CHLORIDE, SODIUM LACTATE AND CALCIUM CHLORIDE: 600; 310; 30; 20 INJECTION, SOLUTION INTRAVENOUS at 08:39

## 2019-01-01 RX ADMIN — FAMOTIDINE 20 MG: 10 INJECTION, SOLUTION INTRAVENOUS at 14:21

## 2019-01-01 RX ADMIN — PIPERACILLIN AND TAZOBACTAM 3.38 G: 3; .375 INJECTION, POWDER, FOR SOLUTION INTRAVENOUS at 05:30

## 2019-01-01 RX ADMIN — SODIUM CHLORIDE, POTASSIUM CHLORIDE, SODIUM LACTATE AND CALCIUM CHLORIDE: 600; 310; 30; 20 INJECTION, SOLUTION INTRAVENOUS at 14:33

## 2019-01-01 RX ADMIN — HYDROMORPHONE HYDROCHLORIDE 1 MG: 2 INJECTION INTRAMUSCULAR; INTRAVENOUS; SUBCUTANEOUS at 15:13

## 2019-01-01 RX ADMIN — HYDROMORPHONE HYDROCHLORIDE 0.5 MG: 1 INJECTION, SOLUTION INTRAMUSCULAR; INTRAVENOUS; SUBCUTANEOUS at 20:08

## 2019-01-01 RX ADMIN — SODIUM BICARBONATE: 84 INJECTION, SOLUTION INTRAVENOUS at 22:30

## 2019-01-01 RX ADMIN — SODIUM CHLORIDE, POTASSIUM CHLORIDE, SODIUM LACTATE AND CALCIUM CHLORIDE 1000 ML: 600; 310; 30; 20 INJECTION, SOLUTION INTRAVENOUS at 09:15

## 2019-01-01 RX ADMIN — PHENYLEPHRINE HYDROCHLORIDE 200 MCG: 10 INJECTION INTRAVENOUS at 14:20

## 2019-01-01 RX ADMIN — Medication 50 MEQ: at 04:22

## 2019-01-01 RX ADMIN — SODIUM CHLORIDE: 9 INJECTION, SOLUTION INTRAVENOUS at 14:32

## 2019-01-01 RX ADMIN — SODIUM CHLORIDE 1000 ML: 9 INJECTION, SOLUTION INTRAVENOUS at 08:52

## 2019-01-01 RX ADMIN — SODIUM CHLORIDE: 9 INJECTION, SOLUTION INTRAVENOUS at 18:51

## 2019-01-01 RX ADMIN — LIDOCAINE HYDROCHLORIDE 100 MG: 20 INJECTION, SOLUTION INTRAVENOUS at 14:15

## 2019-01-01 RX ADMIN — ONDANSETRON HYDROCHLORIDE 4 MG: 4 INJECTION, SOLUTION INTRAMUSCULAR; INTRAVENOUS at 14:21

## 2019-01-01 RX ADMIN — PHENYLEPHRINE HYDROCHLORIDE 300 MCG: 10 INJECTION INTRAVENOUS at 14:34

## 2019-01-01 RX ADMIN — Medication 30 MCG/MIN: at 15:00

## 2019-01-01 RX ADMIN — DEXAMETHASONE SODIUM PHOSPHATE 10 MG: 4 INJECTION, SOLUTION INTRAMUSCULAR; INTRAVENOUS at 11:49

## 2019-01-01 RX ADMIN — OXYCODONE HYDROCHLORIDE AND ACETAMINOPHEN 2 TABLET: 5; 325 TABLET ORAL at 22:10

## 2019-01-01 RX ADMIN — ROCURONIUM BROMIDE 10 MG: 10 INJECTION INTRAVENOUS at 14:15

## 2019-01-01 RX ADMIN — ALBUMIN (HUMAN) 250 ML: 12.5 SOLUTION INTRAVENOUS at 14:40

## 2019-01-01 RX ADMIN — ALBUMIN (HUMAN) 250 ML: 12.5 SOLUTION INTRAVENOUS at 14:28

## 2019-01-01 RX ADMIN — ETOMIDATE 20 MG: 2 INJECTION INTRAVENOUS at 14:15

## 2019-01-01 RX ADMIN — ALBUMIN (HUMAN) 250 ML: 12.5 SOLUTION INTRAVENOUS at 15:00

## 2019-01-01 RX ADMIN — ROSUVASTATIN CALCIUM 40 MG: 20 TABLET, FILM COATED ORAL at 00:48

## 2019-01-01 RX ADMIN — INSULIN LISPRO 6 UNITS: 100 INJECTION, SOLUTION INTRAVENOUS; SUBCUTANEOUS at 11:52

## 2019-01-01 RX ADMIN — HYDROMORPHONE HYDROCHLORIDE 0.5 MG/HR: 10 INJECTION, SOLUTION INTRAMUSCULAR; INTRAVENOUS; SUBCUTANEOUS at 17:03

## 2019-01-01 RX ADMIN — HYDROMORPHONE HYDROCHLORIDE 0.5 MG: 1 INJECTION, SOLUTION INTRAMUSCULAR; INTRAVENOUS; SUBCUTANEOUS at 00:46

## 2019-01-01 RX ADMIN — SODIUM CHLORIDE, POTASSIUM CHLORIDE, SODIUM LACTATE AND CALCIUM CHLORIDE 1000 ML: 600; 310; 30; 20 INJECTION, SOLUTION INTRAVENOUS at 11:22

## 2019-01-01 RX ADMIN — PHENYLEPHRINE HYDROCHLORIDE 300 MCG: 10 INJECTION INTRAVENOUS at 14:15

## 2019-01-01 RX ADMIN — DEXAMETHASONE SODIUM PHOSPHATE 2 MG: 4 INJECTION, SOLUTION INTRAMUSCULAR; INTRAVENOUS at 09:15

## 2019-01-01 RX ADMIN — DEXAMETHASONE SODIUM PHOSPHATE 8 MG: 4 INJECTION, SOLUTION INTRAMUSCULAR; INTRAVENOUS at 14:21

## 2019-01-01 RX ADMIN — PHENYLEPHRINE HYDROCHLORIDE 300 MCG: 10 INJECTION INTRAVENOUS at 14:46

## 2019-01-01 RX ADMIN — CALCIUM GLUCONATE 2 G: 98 INJECTION, SOLUTION INTRAVENOUS at 20:10

## 2019-01-01 RX ADMIN — ACETAMINOPHEN 650 MG: 325 TABLET ORAL at 08:48

## 2019-01-01 RX ADMIN — SODIUM CHLORIDE, POTASSIUM CHLORIDE, SODIUM LACTATE AND CALCIUM CHLORIDE: 600; 310; 30; 20 INJECTION, SOLUTION INTRAVENOUS at 11:20

## 2019-01-01 RX ADMIN — PIPERACILLIN AND TAZOBACTAM 3.38 G: 3; .375 INJECTION, POWDER, FOR SOLUTION INTRAVENOUS at 11:43

## 2019-01-01 RX ADMIN — PIPERACILLIN AND TAZOBACTAM 3.38 G: 3; .375 INJECTION, POWDER, FOR SOLUTION INTRAVENOUS at 20:09

## 2019-01-01 RX ADMIN — ONDANSETRON 4 MG: 2 INJECTION INTRAMUSCULAR; INTRAVENOUS at 06:41

## 2019-01-01 RX ADMIN — ROCURONIUM BROMIDE 10 MG: 10 INJECTION INTRAVENOUS at 14:52

## 2019-01-01 RX ADMIN — INSULIN LISPRO 1 UNITS: 100 INJECTION, SOLUTION INTRAVENOUS; SUBCUTANEOUS at 00:39

## 2019-01-01 RX ADMIN — HYDROMORPHONE HYDROCHLORIDE 1 MG: 2 INJECTION INTRAMUSCULAR; INTRAVENOUS; SUBCUTANEOUS at 14:22

## 2019-01-01 RX ADMIN — HYDROMORPHONE HYDROCHLORIDE 0.5 MG: 1 INJECTION, SOLUTION INTRAMUSCULAR; INTRAVENOUS; SUBCUTANEOUS at 16:44

## 2019-01-01 RX ADMIN — Medication 5 MG: at 15:09

## 2019-01-01 ASSESSMENT — PAIN DESCRIPTION - FREQUENCY
FREQUENCY: CONTINUOUS
FREQUENCY: CONTINUOUS
FREQUENCY: INTERMITTENT

## 2019-01-01 ASSESSMENT — PAIN SCALES - GENERAL
PAINLEVEL_OUTOF10: 10
PAINLEVEL_OUTOF10: 4
PAINLEVEL_OUTOF10: 5
PAINLEVEL_OUTOF10: 4
PAINLEVEL_OUTOF10: 3
PAINLEVEL_OUTOF10: 7
PAINLEVEL_OUTOF10: 3
PAINLEVEL_OUTOF10: 9
PAINLEVEL_OUTOF10: 3
PAINLEVEL_OUTOF10: 3
PAINLEVEL_OUTOF10: 9
PAINLEVEL_OUTOF10: 3
PAINLEVEL_OUTOF10: 6
PAINLEVEL_OUTOF10: 2
PAINLEVEL_OUTOF10: 3
PAINLEVEL_OUTOF10: 0
PAINLEVEL_OUTOF10: 8
PAINLEVEL_OUTOF10: 3
PAINLEVEL_OUTOF10: 3

## 2019-01-01 ASSESSMENT — PAIN DESCRIPTION - LOCATION
LOCATION: ABDOMEN

## 2019-01-01 ASSESSMENT — PAIN - FUNCTIONAL ASSESSMENT: PAIN_FUNCTIONAL_ASSESSMENT: PREVENTS OR INTERFERES WITH MANY ACTIVE NOT PASSIVE ACTIVITIES

## 2019-01-01 ASSESSMENT — PULMONARY FUNCTION TESTS
PIF_VALUE: 16
PIF_VALUE: 15
PIF_VALUE: 14
PIF_VALUE: 18
PIF_VALUE: 16
PIF_VALUE: 15
PIF_VALUE: 3
PIF_VALUE: 3
PIF_VALUE: 16
PIF_VALUE: 14
PIF_VALUE: 10
PIF_VALUE: 1
PIF_VALUE: 18
PIF_VALUE: 1
PIF_VALUE: 1
PIF_VALUE: 3
PIF_VALUE: 16
PIF_VALUE: 16
PIF_VALUE: 3
PIF_VALUE: 15
PIF_VALUE: 17
PIF_VALUE: 0
PIF_VALUE: 16
PIF_VALUE: 15
PIF_VALUE: 16
PIF_VALUE: 16
PIF_VALUE: 18
PIF_VALUE: 16
PIF_VALUE: 17
PIF_VALUE: 15
PIF_VALUE: 15
PIF_VALUE: 16
PIF_VALUE: 15
PIF_VALUE: 22
PIF_VALUE: 15
PIF_VALUE: 1
PIF_VALUE: 14
PIF_VALUE: 1
PIF_VALUE: 15
PIF_VALUE: 19
PIF_VALUE: 16
PIF_VALUE: 1
PIF_VALUE: 2
PIF_VALUE: 16
PIF_VALUE: 15
PIF_VALUE: 16
PIF_VALUE: 17
PIF_VALUE: 22
PIF_VALUE: 16
PIF_VALUE: 18
PIF_VALUE: 16
PIF_VALUE: 16
PIF_VALUE: 4
PIF_VALUE: 1
PIF_VALUE: 17
PIF_VALUE: 1
PIF_VALUE: 1
PIF_VALUE: 16
PIF_VALUE: 43
PIF_VALUE: 17
PIF_VALUE: 15
PIF_VALUE: 1
PIF_VALUE: 3
PIF_VALUE: 16
PIF_VALUE: 16
PIF_VALUE: 21
PIF_VALUE: 16
PIF_VALUE: 7
PIF_VALUE: 2
PIF_VALUE: 16
PIF_VALUE: 16
PIF_VALUE: 1
PIF_VALUE: 1
PIF_VALUE: 16
PIF_VALUE: 1
PIF_VALUE: 15
PIF_VALUE: 16

## 2019-01-01 ASSESSMENT — PAIN DESCRIPTION - PAIN TYPE
TYPE: SURGICAL PAIN
TYPE: ACUTE PAIN
TYPE: ACUTE PAIN;SURGICAL PAIN
TYPE: SURGICAL PAIN
TYPE: ACUTE PAIN;SURGICAL PAIN

## 2019-01-01 ASSESSMENT — PAIN DESCRIPTION - PROGRESSION
CLINICAL_PROGRESSION: GRADUALLY WORSENING
CLINICAL_PROGRESSION: GRADUALLY WORSENING
CLINICAL_PROGRESSION: NOT CHANGED

## 2019-01-01 ASSESSMENT — PAIN DESCRIPTION - DESCRIPTORS
DESCRIPTORS: TENDER;SHARP
DESCRIPTORS: SHARP
DESCRIPTORS: SHARP

## 2019-01-01 ASSESSMENT — PAIN DESCRIPTION - ONSET
ONSET: ON-GOING

## 2019-01-01 ASSESSMENT — PAIN DESCRIPTION - ORIENTATION
ORIENTATION: RIGHT;LEFT;LOWER
ORIENTATION: RIGHT;LEFT;LOWER
ORIENTATION: RIGHT;LEFT;LOWER;MID;UPPER
ORIENTATION: MID

## 2019-06-01 PROBLEM — A41.9 SEPSIS (HCC): Status: ACTIVE | Noted: 2019-01-01

## 2019-06-02 PROBLEM — R18.8: Status: ACTIVE | Noted: 2019-01-01

## 2019-06-02 PROBLEM — R10.9 ABDOMINAL PAIN: Status: ACTIVE | Noted: 2019-01-01

## 2019-06-02 PROBLEM — K35.32 PERFORATION OF CECUM: Status: ACTIVE | Noted: 2019-01-01

## 2019-06-02 PROBLEM — R19.7 DIARRHEA: Status: ACTIVE | Noted: 2019-01-01

## 2019-06-02 NOTE — PROGRESS NOTES
Transfer  Report from [address] [place] [address]  Referring Physician Dr Cortney Howard  Accepting Physician Dr Adela Shaw  Patient Condition: presents with \"ascites\", source unclear, appears to be from female organs. Lactic 3.9, WBC 22, Creat 2.2 K 5.7 but sample was partially hemolyzed. Appendix not visible on CT. Potential floor/room: 4K05  Vitals B/P 121/81             P 108            R  22          P02  95% on RA           T          Oxygen needs  IV Drips 1 Liter  NS 4.5 mg Zosyn  Fax Face Sheet  Accepted on behalf of Dr Adela Shaw to [address] [place] [address] with diagnosis of sepsis.  Dr Adela Shaw did conference with physician

## 2019-06-02 NOTE — BRIEF OP NOTE
Brief Postoperative Note  ______________________________________________________________    Patient: Martha Avila  YOB: 1935  MRN: [id number]  Date of Procedure: 6/2/2019    Pre-Op Diagnosis: Free Fluid? Post-Op Diagnosis: perforated cecal cancer medial wall, no appendix present, a lot of ascites, perf site walled off by SB, omentum and liver were clean, no peritoneal implants        Procedure(s):  LAPAROTOMY EXPLORATORY    Anesthesia: Anesthesia type not filed in the log. Surgeon(s):  Don Dumont MD    Assistant: none    Estimated Blood Loss (mL): less than 50     Complications: None    Specimens:   ID Type Source Tests Collected by Time Destination   1 : Peritoneal Fluid Body Fluid Abdomen ANAEROBIC AND AEROBIC CULTURE Don Dumont MD 6/2/2019 1505    B : Right Colon and Portion of Omentum Tissue Colon SURGICAL PATHOLOGY Don Dumont MD 6/2/2019 1449        Implants:  * No implants in log *      Drains:   Closed/Suction Drain Inferior;Midline Abdomen Bulb 19 Latvian (Active)       NG/OG/NJ/NE Tube Nasogastric 18 fr Right mouth (Active)   Surrounding Skin Dry; Intact 6/2/2019  3:01 PM   Securement device Yes 6/2/2019  3:01 PM   Status Suction-low continuous 6/2/2019  3:01 PM   Drainage Appearance Brown 6/2/2019  3:01 PM       Findings: see op    Don Dumont MD  Date: 6/2/2019  Time: 3:22 PM

## 2019-06-02 NOTE — FLOWSHEET NOTE
06/02/19 0996   Provider Notification   Reason for Communication Review case   Provider Name FIORELLA Eduardo   Provider Notification Physician Assistant   Method of Communication Secure Message   Response Waiting for response   Notification Time 0902     Inpatient consult to General surgery from Dr. Kristina Guillen for: Free fluid in abdomen with acute abdominal pain and sepsis of unclear etiology. VSS. No nausea or emesis since direct admit from [facility] at about [address] Liquid stools, positive for ecoli.  May I add this patient to your list?    Response: I'll pass it along to the general surgeon on call thanks

## 2019-06-02 NOTE — H&P
History & Physical      PCP: No primary care provider on file. Date of Admission: 6/2/2019    Date of Service: 6/2/19    Chief Complaint:  Abdominal pain, nausea, diarrhea    History Of Present Illness:    History obtained from chart review and the patient. 82 y.o. female who presented to [facility] with two days of worsening abdominal pain, nausea, emesis, and diarrhea. Patient presented to [facility] ED for these complaints, CT scan done there showed free fluid in abdomen with WBC of 22k, normal LFTs, LEIA. When seen patient cites each BM has been painful and followed by emesis until just now on arrival - she had moderate amount of mucous containing diarrhea without worsening abdominal pain, nor an emesis. She continues to have exquisite abdominal pain, worse with palpation, movement and cough. She denies any fever or chills at this time. Past Medical History:          Diagnosis Date    Arthritis     CAD (coronary artery disease)     Cerebral artery occlusion with cerebral infarction ([facility] [address]     Hypertension        Past Surgical History:          Procedure Laterality Date    BACK SURGERY      CHOLECYSTECTOMY      HYSTERECTOMY      JOINT REPLACEMENT      Left knee       Medications Prior to Admission:      Prior to Admission medications    Medication Sig Start Date End Date Taking?  Authorizing Provider   Calcium Carb-Cholecalciferol 600-500 MG-UNIT CAPS Take by mouth daily Indications: calcium carbonate 1200 mg plus vitamin D3 1000 units, 1 capsule daily   Yes Historical Provider, MD   acetaminophen (TYLENOL 8 HOUR) 650 MG extended release tablet Take 650 mg by mouth every 8 hours as needed for Pain Indications: tylenol arthritis 650 mg q 6 hours prn   Yes Historical Provider, MD   ferrous gluconate (FERGON) 225 (27 Fe) MG tablet Take 27 mg by mouth daily   Yes Historical Provider, MD   rosuvastatin (CRESTOR) 40 MG tablet Take 40 mg by mouth nightly   Yes Historical Provider, MD traMADol (ULTRAM) 50 MG tablet Take 100 mg by mouth 3 times daily as needed for Pain. Yes Historical Provider, MD   LOSARTAN POTASSIUM PO Take 100 mg/day by mouth daily   Yes Historical Provider, MD   ezetimibe (ZETIA) 10 MG tablet Take 10 mg by mouth daily   Yes Historical Provider, MD   clopidogrel (PLAVIX) 75 MG tablet Take 75 mg by mouth every morning   Yes Historical Provider, MD   furosemide (LASIX) 20 MG tablet Take 20 mg by mouth daily   Yes Historical Provider, MD   potassium chloride (MICRO-K) 10 MEQ extended release capsule Take 10 mEq by mouth daily   Yes Historical Provider, MD   carvedilol (COREG) 3.125 MG tablet Take 3.125 mg by mouth 2 times daily (with meals)   Yes Historical Provider, MD   aspirin 81 MG tablet Take 81 mg by mouth daily   Yes Historical Provider, MD       Allergies:  Morphine    Social History:      The patient currently lives at home    TOBACCO:   reports that she has never smoked. She has never used smokeless tobacco.  ETOH:   reports that she drank alcohol. Family History:      Reviewed in detail. Positive as follows:        Problem Relation Age of Onset    Coronary Art Dis Mother     Arthritis Paternal Grandfather     Diabetes Paternal Grandfather     Heart Attack Paternal Grandfather        REVIEW OF SYSTEMS:   14 point review of system performed, pertinent positives as noted in the HPI, all other systems negative/at baseline. PHYSICAL EXAM:    BP (!) 150/64   Pulse 103   Temp 97.5 °F (36.4 °C) (Oral)   Resp 17   Ht 5' 6\" (1.676 m)   Wt 202 lb 11.2 oz (91.9 kg)   SpO2 92%   Breastfeeding? No Comment: complete hysterectomy  BMI 32.72 kg/m²       General appearance:  No apparent distress, appears stated age and cooperative. HEENT:  Normal cephalic, atraumatic without obvious deformity. Pupils equal, round, and reactive to light. Extra ocular muscles intact. Conjunctivae/corneas clear. Neck: Supple, with full range of motion.  No jugular venous distention. Trachea midline. Respiratory:  Normal respiratory effort. Clear to auscultation, bilaterally without Rales/Wheezes/Rhonchi. Cardiovascular:  Regular rate and rhythm with normal S1/S2 without murmurs, rubs or gallops. Abdomen: Soft, tender, non-distended with absent bowel sounds. No guarding or rebound. No pain with toe-tap. Musculoskeletal:  No clubbing, cyanosis or edema bilaterally. Full range of motion without deformity. Skin: Skin color, texture, turgor normal.  No rashes or lesions. Neurologic:  Neurovascularly intact without any focal sensory/motor deficits. Cranial nerves: II-XII intact, grossly non-focal.  Psychiatric:  Alert and oriented, thought content appropriate, normal insight  Capillary Refill: Brisk,< 3 seconds   Peripheral Pulses: +2 palpable, equal bilaterally       Labs:     No results for input(s): WBC, HGB, HCT, PLT in the last 72 hours. No results for input(s): NA, K, CL, CO2, BUN, CREATININE, CALCIUM, PHOS in the last 72 hours. Invalid input(s): MAGNES  No results for input(s): AST, ALT, BILIDIR, BILITOT, ALKPHOS in the last 72 hours. No results for input(s): INR in the last 72 hours. No results for input(s): Ellender Morales in the last 72 hours.     Urinalysis:    No results found for: Janet Hives, BACTERIA, RBCUA, BLOODU, [address], [address]    Radiology:   I have reviewed the imaging studies with the following interpretation:  No orders to display   Outside CT scan showing free fluid in abdomen without obvious etiology, no pneumoperitoneum      Diet:  Diet NPO Effective Now    DVT prophylaxis: [x] Lovenox                                 [] SCDs                                 [] SQ Heparin                                 [] Encourage ambulation           [] Already on Anticoagulation    Code Status: Full Code      PT/OT Eval Status: not at this time    Disposition:    [x] Home       [] TCU       [] Rehab       [] Psych       [x] SNF       [] [facility]

## 2019-06-02 NOTE — PLAN OF CARE
Problem: Falls - Risk of:  Goal: Absence of physical injury  Description  Absence of physical injury  Outcome: Met This Shift  Note:   None noted     Problem: Pain:  Goal: Pain level will decrease  Description  Pain level will decrease  Outcome: Ongoing  Note:   Prn pain meds     Problem: Pain:  Goal: Control of acute pain  Description  Control of acute pain  Outcome: Ongoing  Note:   Prn pain meds     Problem: Falls - Risk of:  Goal: Will remain free from falls  Description  Will remain free from falls  Outcome: Ongoing  Note:   Instructed on fall precautions     Problem: Risk for Impaired Skin Integrity  Goal: Tissue integrity - skin and mucous membranes  Description  Structural intactness and normal physiological function of skin and  mucous membranes. Outcome: Ongoing  Note:   Skin kept clean and dry. repositioned as needed     Problem: Discharge Planning:  Goal: Participates in care planning  Description  Participates in care planning  Outcome: Ongoing  Note:   Family and pt updated, questions answered. Problem: Gas Exchange - Impaired:  Goal: Levels of oxygenation will improve  Description  Levels of oxygenation will improve  Outcome: Ongoing  Note:   Scattered mottling noted, improving nasal cannula on. Problem: Discharge Planning:  Goal: Discharged to appropriate level of care  Description  Discharged to appropriate level of care  Outcome: Not Met This Shift  Note:   Monitoring in icu at this time. Pt plans return home with . Problem: Bowel Function - Altered:  Goal: Bowel elimination is within specified parameters  Description  Bowel elimination is within specified parameters  Outcome: Not Met This Shift     Problem: Tissue Perfusion, Altered:  Goal: Circulatory function within specified parameters  Description  Circulatory function within specified parameters  Outcome: Not Met This Shift  Note:   Scattered mottling noted. bp stable no need for pressors   Care plan reviewed with patient and family. Patient and family verbalize understanding of the plan of care and contribute to goal setting.

## 2019-06-02 NOTE — FLOWSHEET NOTE
06/02/19 0656   Provider Notification   Reason for Communication Review case   Provider Name Katie Palumbo   Provider Notification Physician   Method of Communication Secure Message   Response See orders   Notification Time 365    Patient states that she has a morphine allergy with side effects of nausea and vomiting. She states, \"It makes me sicker than a dog. \" May I remove morphine from the [facility]? Tylenol is on board, and she has not yet asked for any pain medication. Discontinued ms due to allergy.

## 2019-06-02 NOTE — PROGRESS NOTES
Hospitalist note    Patient seen and examined. Based on history provided and evidence for enteropathogenic E. Coli, free fluid on CT scan of abdomen/pelvis, there was concern for microperforation of bowel and Dr. Christelle Chang, General Surgery was consulted overnight. Patient to remain on Zosyn. IV fluid bolus ordered and trend lactic acid. Per General Surgery, patient to be taken for ex-lap this afternoon. There is also concern that the patient's appendix may have ruptured.     Consider ID consult      Electronically signed by Elijah Weinberg MD on 6/2/2019 at 2:27 PM

## 2019-06-02 NOTE — ANESTHESIA PRE PROCEDURE
Department of Anesthesiology  Preprocedure Note       Name:  Michelle Richardson   Age:  82 y.o.  :  1935                                          MRN:  [id number]         Date:  2019      Surgeon: Jeffry Hitchcock):  Bhavik Rice MD    Procedure: LAPAROTOMY EXPLORATORY (N/A Abdomen)    Medications prior to admission:   Prior to Admission medications    Medication Sig Start Date End Date Taking? Authorizing Provider   Calcium Carb-Cholecalciferol 600-500 MG-UNIT CAPS Take by mouth daily Indications: calcium carbonate 1200 mg plus vitamin D3 1000 units, 1 capsule daily   Yes Historical Provider, MD   acetaminophen (TYLENOL 8 HOUR) 650 MG extended release tablet Take 650 mg by mouth every 8 hours as needed for Pain Indications: tylenol arthritis 650 mg q 6 hours prn   Yes Historical Provider, MD   ferrous gluconate (FERGON) 225 (27 Fe) MG tablet Take 27 mg by mouth daily   Yes Historical Provider, MD   rosuvastatin (CRESTOR) 40 MG tablet Take 40 mg by mouth nightly   Yes Historical Provider, MD   traMADol (ULTRAM) 50 MG tablet Take 100 mg by mouth 3 times daily as needed for Pain.    Yes Historical Provider, MD   LOSARTAN POTASSIUM PO Take 100 mg/day by mouth daily   Yes Historical Provider, MD   ezetimibe (ZETIA) 10 MG tablet Take 10 mg by mouth daily   Yes Historical Provider, MD   clopidogrel (PLAVIX) 75 MG tablet Take 75 mg by mouth every morning   Yes Historical Provider, MD   furosemide (LASIX) 20 MG tablet Take 20 mg by mouth daily   Yes Historical Provider, MD   potassium chloride (MICRO-K) 10 MEQ extended release capsule Take 10 mEq by mouth daily   Yes Historical Provider, MD   carvedilol (COREG) 3.125 MG tablet Take 3.125 mg by mouth 2 times daily (with meals)   Yes Historical Provider, MD   aspirin 81 MG tablet Take 81 mg by mouth daily   Yes Historical Provider, MD       Current medications:    Current Facility-Administered Medications   Medication Dose Route Frequency Provider Last Rate Last Dose  [Held by provider] rosuvastatin (CRESTOR) tablet 40 mg  40 mg Oral Nightly [place] Karl, DO        [Held by provider] potassium chloride (KLOR-CON) extended release tablet 10 mEq  10 mEq Oral Daily [place] Karl, DO        [Held by provider] clopidogrel (PLAVIX) tablet 75 mg  75 mg Oral QAM [place] Karl, DO        [Held by provider] carvedilol (COREG) tablet 3.125 mg  3.125 mg Oral BID WC [place] Karl, DO        [Held by provider] aspirin EC tablet 81 mg  81 mg Oral Daily [place] Karl, DO        sodium chloride flush 0.9 % injection 10 mL  10 mL Intravenous 2 times per day [place] Karl, DO        sodium chloride flush 0.9 % injection 10 mL  10 mL Intravenous PRN [place] Karl, DO        0.9 % sodium chloride infusion   Intravenous Continuous [place] Karl,  mL/hr at 06/02/19 0320      piperacillin-tazobactam (ZOSYN) 3.375 g in dextrose 5 % 50 mL IVPB extended infusion (mini-bag)  3.375 g Intravenous Q8H [place] Karl, DO 12.5 mL/hr at 06/02/19 1143 3.375 g at 06/02/19 1143    potassium replacement protocol   Other RX Placeholder [place] Karl, DO        ondansetron [facility]) injection 4 mg  4 mg Intravenous Q6H PRN Vishal Peterson MD        acetaminophen (TYLENOL) tablet 650 mg  650 mg Oral Q4H PRN Vishal Peterson MD   650 mg at 06/02/19 0848    potassium chloride (KLOR-CON M) extended release tablet 40 mEq  40 mEq Oral PRN Vishal Peterson MD        Or   Nakita Patel potassium bicarb-citric acid (EFFER-K) effervescent tablet 40 mEq  40 mEq Oral PRN Vishal Peterson MD        Or   Nakita Patel potassium chloride 10 mEq/100 mL IVPB (Peripheral Line)  10 mEq Intravenous PRN Vishal Peterson MD        potassium chloride 10 mEq/100 mL IVPB (Peripheral Line)  10 mEq Intravenous PRN Vishal Peterson MD        magnesium sulfate 1 g in dextrose 5 % 100 mL IVPB  1 g Intravenous PRN MD Nakita Alejandro [START ON 6/3/2019] enoxaparin (LOVENOX) injection 30 mg  30 mg Subcutaneous Daily Dominick Barajas DO  traMADol (ULTRAM) tablet 100 mg  100 mg Oral Q8H PRN Kadie Kohler MD           Allergies: Allergies   Allergen Reactions    Morphine Nausea And Vomiting       Problem List:    Patient Active Problem List   Diagnosis Code    Sepsis ([facility] [address] A41.9    Fluid filled abdomen R18.8    Abdominal pain R10.9    Diarrhea R19.7    Hypertension I10    Cerebral artery occlusion with cerebral infarction ([facility] [address] I63.50    CAD (coronary artery disease) I25.10       Past Medical History:        Diagnosis Date    Arthritis     CAD (coronary artery disease)     Cerebral artery occlusion with cerebral infarction ([facility] [address]     Hypertension        Past Surgical History:        Procedure Laterality Date    BACK SURGERY      CHOLECYSTECTOMY      HYSTERECTOMY      JOINT REPLACEMENT      Left knee       Social History:    Social History     Tobacco Use    Smoking status: Never Smoker    Smokeless tobacco: Never Used   Substance Use Topics    Alcohol use: Not Currently                                Counseling given: Not Answered      Vital Signs (Current):   Vitals:    06/02/19 0144 06/02/19 0420 06/02/19 0830 06/02/19 1120   BP: (!) 150/64 120/64 119/67 (!) 126/52   Pulse: 103 100 108 110   Resp: 17 18 22 18   Temp: 97.5 °F (36.4 °C) 97.2 °F (36.2 °C) 98 °F (36.7 °C) 98.1 °F (36.7 °C)   TempSrc: Oral Oral Oral Oral   SpO2: 92% 92% 95% 94%   Weight: 202 lb 11.2 oz (91.9 kg)      Height: 5' 6\" (1.676 m)                                                 BP Readings from Last 3 Encounters:   06/02/19 (!) 126/52   06/02/19 138/72       NPO Status:                                                                                 BMI:   Wt Readings from Last 3 Encounters:   06/02/19 202 lb 11.2 oz (91.9 kg)     Body mass index is 32.72 kg/m².     CBC:   Lab Results   Component Value Date    WBC 23.0 06/02/2019    RBC 4.92 06/02/2019    HGB 16.3 06/02/2019    HCT 50.7 06/02/2019    .0 06/02/2019     06/02/2019       CMP:

## 2019-06-02 NOTE — ANESTHESIA POSTPROCEDURE EVALUATION
Department of Anesthesiology  Postprocedure Note    Patient: Jesse Cornelius  MRN: [id number]  YOB: 1935  Date of evaluation: 6/2/2019  Time:  4:34 PM     Procedure Summary     Date:  06/02/19 Room / Location:  [facility]    Anesthesia Start:  1401 Anesthesia Stop:  2533    Procedure:  LAPAROTOMY EXPLORATORY (N/A Abdomen) Diagnosis:  (Free Fluid?)    Surgeon:  Riley Hudson MD Responsible Provider:  Diana Chopra MD    Anesthesia Type:  general ASA Status:  4 - Emergent          Anesthesia Type: No value filed. Pablo Phase I:      Pablo Phase II:      Last vitals: Reviewed and per EMR flowsheets.        Anesthesia Post Evaluation    Patient location during evaluation: PACU  Patient participation: complete - patient participated  Level of consciousness: awake and alert  Pain score: 0  Airway patency: patent  Nausea & Vomiting: no vomiting and no nausea  Complications: no  Cardiovascular status: blood pressure returned to baseline  Respiratory status: acceptable, face mask and spontaneous ventilation  Hydration status: euvolemic

## 2019-06-02 NOTE — CONSULTS
[facility]  General Surgery Consult Note  Dr. Safia Hewitt  Pt Name: Sue Dove  MRN: [id number]  YOB: 1935  Date of evaluation: 6/2/2019  Primary Care Physician: No primary care provider on file. Patient evaluated at the request of  Dr. Jose Roy  Reason for evaluation: abd sepsis  IMPRESSIONS:     Active Hospital Problems    Diagnosis Date Noted    Fluid filled abdomen [R18.8] 06/02/2019    Abdominal pain [R10.9] 06/02/2019    Diarrhea [R19.7] 06/02/2019    Hypertension [I10]     Cerebral artery occlusion with cerebral infarction ([facility] [address] [I63.50]     CAD (coronary artery disease) [I25.10]     Sepsis ([facility] [address] [A41.9] 06/01/2019     PLANS:   1. Pt transfer orders taken around 11 pm  2. Order for consult 9588  3. No calls made by anyone to gen surgery  4. [employer] message sent at 0630  5. Pt states pain is better now than the 2 days prior to admission  6. Pain score a 3  7. Change her diet to NPO especially in someone suspected of having abdominal sepsis! 8. Cont IV antibiotics  9. Stool studies have grown enteropathogenic E. Coli  10. Clinically she doesn't have an acute abdomen that requires an emergent surgery. It is still unclear what the etiology of all this is   11. Repeat serial labs follow-up recommended  SUBJECTIVE:   History of Chief Complaint:    Sue Dove is a 82 y.o. female who presented to [facility] ER last night with a 2 day history of abdominal pain. She has had all her prior surgeries done at [facility] including hysterectomy and ventral hernia repair cholecystectomy and she was unclear about whether they took out her appendix in the past or not. Per the records and the patient she went to the ER because of pain nausea vomiting and diarrhea. CAT scan there done showed some free fluid in the abdomen of uncertain etiology no mesenteric edema and no wall edema or thickening of any the intestine some mucousy stools.   Her white count was elevated at capsule Take 10 mEq by mouth daily   Yes Historical Provider, MD   carvedilol (COREG) 3.125 MG tablet Take 3.125 mg by mouth 2 times daily (with meals)   Yes Historical Provider, MD   aspirin 81 MG tablet Take 81 mg by mouth daily   Yes Historical Provider, MD    Scheduled Meds:   [Held by provider] rosuvastatin  40 mg Oral Nightly    [Held by provider] potassium chloride  10 mEq Oral Daily    [Held by provider] clopidogrel  75 mg Oral QAM    [Held by provider] carvedilol  3.125 mg Oral BID WC    [Held by provider] aspirin  81 mg Oral Daily    sodium chloride flush  10 mL Intravenous 2 times per day    enoxaparin  40 mg Subcutaneous Daily    piperacillin-tazobactam  3.375 g Intravenous Q8H    potassium replacement protocol   Other RX Placeholder     Continuous Infusions:   sodium chloride 100 mL/hr at 06/02/19 0320     PRN Meds:.sodium chloride flush, ondansetron, acetaminophen, potassium chloride **OR** potassium alternative oral replacement **OR** potassium chloride, potassium chloride, magnesium sulfate  Allergies  Morphine  Family History  Family History   Problem Relation Age of Onset    Coronary Art Dis Mother     Arthritis Paternal Grandfather     Diabetes Paternal Grandfather     Heart Attack Paternal Grandfather      Social History  Social History     Socioeconomic History    Marital status:      Spouse name: None    Number of children: None    Years of education: None    Highest education level: None   Occupational History    Occupation: retired     Comment: never worked   Social Needs    Financial resource strain: None    Food insecurity:     Worry: None     Inability: None    Transportation needs:     Medical: None     Non-medical: None   Tobacco Use    Smoking status: Never Smoker    Smokeless tobacco: Never Used   Substance and Sexual Activity    Alcohol use: Not Currently    Drug use: Not Currently    Sexual activity: Not Currently   Lifestyle    Physical activity: Days per week: None     Minutes per session: None    Stress: None   Relationships    Social connections:     Talks on phone: None     Gets together: None     Attends Jehovah's witness service: None     Active member of club or organization: None     Attends meetings of clubs or organizations: None     Relationship status: None    Intimate partner violence:     Fear of current or ex partner: None     Emotionally abused: None     Physically abused: None     Forced sexual activity: None   Other Topics Concern    None   Social History Narrative    None     Review of Systems:  General Denies any fever or chills  HEENT Denies any diplopia, tinnitus or vertigo  Resp Denies any shortness of breath, cough or wheezing  Cardiac Denies any chest pain, palpitations, claudication or edema  GI see Ak Chin   Denies any frequency, urgency, hesitancy or incontinence  Heme Denies bruising or bleeding easily  Endocrine Denies any history of diabetes or thyroid disease  Neuro Denies any focal motor or sensory deficits  OBJECTIVE:   CURRENT VITALS:  height is 5' 6\" (1.676 m) and weight is 202 lb 11.2 oz (91.9 kg). Her oral temperature is 98 °F (36.7 °C). Her blood pressure is 119/67 and her pulse is 108. Her respiration is 22 and oxygen saturation is 95%. Body mass index is 32.72 kg/m². Temperature Range (24h):Temp: 98 °F (36.7 °C) Temp  Av.6 °F (36.4 °C)  Min: 97.2 °F (36.2 °C)  Max: 98 °F (36.7 °C)  BP Range (84M): Systolic (58LAS), EIH:903 , Min:119 , ZFY:135     Diastolic (94VZE), OOS:36, Min:64, Max:67    Pulse Range (24h): Pulse  Av.7  Min: 100  Max: 108  Respiration Range (24h): Resp  Av  Min: 17  Max: 22  Current Pulse Ox (24h):  SpO2: 95 %  Pulse Ox Range (24h):  SpO2  Av %  Min: 92 %  Max: 95 %  Oxygen Amount and Delivery:    CONSTITUTIONAL: Alert and oriented times 3, no acute distress and cooperative to examination with proper mood and affect.   SKIN: Skin color, texture, turgor normal. No rashes or lesions. LYMPH: no cervical nodes, no inguinal nodes  HEENT: Head is normocephalic, atraumatic. EOMI, PERRLA. NECK: Supple, symmetrical, trachea midline, no adenopathy, thyroid symmetric, not enlarged and no tenderness, skin normal.  CHEST/LUNGS: chest symmetric with normal A/P diameter, normal respiratory rate and rhythm, lungs clear to auscultation without wheezes, rales or rhonchi. No accessory muscle use. Scars None   CARDIOVASCULAR: Heart sounds are normal.  Regular rate and rhythm without murmur, gallop or rub. Normal S1 and S2. . Carotid and femoral pulses 2+/4 and equal bilaterally. ABDOMEN: Normal shape. She has a midline scar from her hysterectomy and laparoscopic scar(s) present. Abnormal bowel sounds: hyperactive  Soft, nondistended, no masses or organomegaly. no evidence of hernia. Percussion: Normal without hepatosplenomegally. Tenderness: To palpation in the lower abdomen without guarding. She has no obvious peritoneal signs. Her abdomen does not hurt with a abdominal wall shake or bed shake. Carri Pepe RECTAL: deferred, not clinically indicated  NEUROLOGIC: There are no focalizing motor or sensory deficits. CN II-XII are grossly intact. Carri Pepe EXTREMITIES: no cyanosis, no clubbing, no edema and left total knee arthroplasty scar. LABS:     Recent Labs     06/02/19  0322 06/02/19  0813   WBC 23.0*  --    HGB 16.3*  --    HCT 50.7*  --      --      --    K 5.0  --      --    CO2 16*  --    BUN 51*  --    CREATININE 2.5*  --    CALCIUM 8.3*  --    LACTA  --  4.6*     CMP:    Lab Results   Component Value Date     06/02/2019    K 5.0 06/02/2019     06/02/2019    CO2 16 06/02/2019    BUN 51 06/02/2019    CREATININE 2.5 06/02/2019    LABGLOM 18 06/02/2019    GLUCOSE 185 06/02/2019    CALCIUM 8.3 06/02/2019     {MICROBIOLOGY:Results for Jalil Estrada (MRN [id number]) as of 6/2/2019 09:56   Ref.  Range 6/2/2019 02:45   Adenovirus F 40 41 PCR Unknown Not Detected   E Coli 0157 PCR Unknown NA   E Coli Enteroaggregative PCR Unknown Not Detected   E Coli Enteropathogenic PCR Unknown Detected (A)   E Coli Enterotoxigenic PCR Unknown Not Detected   E Coli Shiga Like Toxin PCR Unknown Not Detected   E Coli Shigella/Enteroinvasive PCR Unknown Not Detected   Norovirus GI GII PCR Unknown Not Detected   Plesiomonas Shigelloides PCR Unknown Not Detected   Rotavirus A PCR Unknown Not Detected   Salmonella PCR Unknown Not Detected   Sapovirus PCR Unknown Not Detected   Vibrio Cholerae PCR Unknown Not Detected   Vibrio PCR Unknown Not Detected   Yersinia Enterocolitica PCR Unknown Not Detected   Campylobacter PCR Unknown Not Detected   Clostridium difficile, PCR Unknown Not Detected   Cryptosporidium PCR Unknown Not Detected   Cyclospora Cayetanensis PCR Unknown Not Detected   E HISTOLYTICA GI FILM ARRAY Unknown Not Detected   Giardia Lamblia PCR Unknown Not Detected   Astrovirus PCR Unknown Not Detected     RADIOLOGY:   I have personally reviewed the following films:  Ct Interpretation Of Outside Images    Result Date: 6/2/2019   1. Small amount of ascites in the abdomen and pelvis. This is of unknown etiology. 2. Possible inflammation in the right lower quadrant near the cecum. There is some fluid near the cecum. There is some fat stranding. The appendix is not identified. The differential diagnosis includes colitis, acute appendicitis and malignancy. A repeat  CT scan with oral and intravenous contrast could provide more information. 3. Diverticulosis. 4. Hiatal hernia. **This report has been created using voice recognition software. It may contain minor errors which are inherent in voice recognition technology. ** Final report electronically signed by Dr. Gerri Avilez on 6/2/2019 8:54 AM     Thank you for the evaluation.  Further recommendations above        Electronically signed by Myrtle Prather MD on 6/2/2019 at 9:59 AM

## 2019-06-02 NOTE — FLOWSHEET NOTE
06/02/19 2972 06/02/19 0907   Provider Notification   Reason for Communication Review case Review case   Provider Name [id number]   Provider Notification Physician Assistant Physician Assistant   Method of Communication Secure Message Secure Message   Response Waiting for response See orders   Notification Time [phone]   Night shift RN texted general surgery consult this Felice Cooley responded he would let general surgeon on call know about the consult. I texted him again at [phone] to see who the surgeon was so I could add to that physician's list. Fransisco Lo responded Dr. Tristan Chowdhury is on call today.  Added patient to his list.

## 2019-06-02 NOTE — PROGRESS NOTES
Pharmacy Renal Adjustment    Michelle Islas is a 82 y.o. female. Pharmacy renally adjust the following medications per P&T approved policy: Lovenox    Recent Labs     06/02/19  0322   BUN 51*       Recent Labs     06/02/19  0322   CREATININE 2.5*       Estimated Creatinine Clearance: 20 mL/min (A) (based on SCr of 2.5 mg/dL (H)). Height:   Ht Readings from Last 1 Encounters:   06/02/19 5' 6\" (1.676 m)     Weight:  Wt Readings from Last 1 Encounters:   06/02/19 202 lb 11.2 oz (91.9 kg)       Baseline SCr: Unknown    Plan: Adjustments based on renal function:          Decrease Lovenox 40 mg SQ daily to Lovenox 30 mg SQ daily          If Scr worsens further, Zosyn would have to be adjusted to Q12H.  Will continue as Zosyn 3.375 grams IVPB Q8H for now                Salvador Talavera, PharmD, BCPS  6/2/2019  12:37 PM

## 2019-06-03 NOTE — PROGRESS NOTES
Perla Valencia, RN and Prashanth Lakhani, RN assisting with procedure. Time out completed at this time. 5966 - Dr. Walker Salazar updated on recent ABGs. New orders received to push 1 amp Bicarb at this time. 0422 - Sodium Bicarbonate 50mEq administered at this time. 0425 - /78 MAP 96. . SpO2 95% on 5L/min supplemental O2 per nasal cannula. 5 - Dr. Walker Salazar attempting to place central line in patient's left groin at this time. 46 - Dr. Walker Salazar unable to place central line at this time. Talking with Dr Víctor Pitts at present regarding line placement. 0505-Pt has adequate BP with existing 2 peripheral lines, are to wait until Dr Toro Hoskins arrives for line placement. 0615-Dr De Dios in to see pt, updated. Speaks with Dr oTro Hoskins as well.    3325-9993-FPS CVC placed per Dr Toro Hoskins, pt kolton fair, resisted following commands and trying to sit up a few times as well as pulling away, procedure completed without difficulty. Try to explain things to pt briefly a number of times, tells RN she's \"lying\", \"this is abuse making me lay in this bed', and to \"just shut up\". Reassured pt we are trying to make her feel better. All gtts switched to CVC per Dr Ochoa Safer order.

## 2019-06-03 NOTE — PROGRESS NOTES
Patient  on 6/3/19; confirmed death at [phone]. Absence of vital signs, absence of neurological response, physician notified and orders obtained to release the body. Post-Mortem documentation completed; form printed, signed, and given to admitting.     Layo Sanchez RN Nursing Supervisor  6/3/19   6:25 PM

## 2019-06-03 NOTE — PROGRESS NOTES
°F (36.4 °C) Axillary 92 13 97 % -- --   06/02/19 2003 103/70 -- -- 96 13 91 % -- --   06/02/19 1903 90/68 -- -- 94 11 94 % -- --   06/02/19 1757 -- -- -- -- -- 92 % -- --   06/02/19 1700 121/67 -- -- 91 16 93 % -- --   06/02/19 1630 (!) 135/49 97.7 °F (36.5 °C) Rectal 93 19 92 % 5' 6\" (1.676 m) 206 lb 2.1 oz (93.5 kg)   06/02/19 1605 (!) 114/58 -- -- 93 24 96 % -- --   06/02/19 1600 (!) 125/56 -- -- 94 22 97 % -- --   06/02/19 1555 (!) 117/56 -- -- 94 20 100 % -- --   06/02/19 1550 (!) 129/59 -- -- 96 21 99 % -- --   06/02/19 1545 (!) 130/59 -- -- 97 25 100 % -- --   06/02/19 1540 133/61 -- -- 100 25 100 % -- --   06/02/19 1535 -- -- -- 101 27 100 % -- --   06/02/19 1530 (!) 144/67 -- -- 106 29 100 % -- --   06/02/19 1525 -- -- -- 113 -- 98 % -- --   06/02/19 1521 -- 97.4 °F (36.3 °C) Temporal 114 20 100 % -- --   06/02/19 1120 (!) 126/52 98.1 °F (36.7 °C) Oral 110 18 94 % -- --   06/02/19 0830 119/67 98 °F (36.7 °C) Oral 108 22 95 % -- --         Intake/Output Summary (Last 24 hours) at 6/3/2019 0626  Last data filed at 6/3/2019 0518  Gross per 24 hour   Intake 5039.18 ml   Output 810 ml   Net 4229.18 ml       In: 5039.2 [P.O.:60; I.V.:4979.2]  Out: 810 [Urine:40; Drains:670]    I/O last 3 completed shifts: In: 3913.7 [P.O.:60; I.V.:3853.7]  Out: 670 [Urine:30; Emesis/NG output:100; Drains:540]     Date 06/03/19 0000 - 06/03/19 2359   Shift 9244-5091 2941-6039 0492-2498 24 Hour Total   INTAKE   I.V.(mL/kg) 7295(18.0)   2835(79.0)   Shift Total(mL/kg) 3527(23.8)   0377(45.4)   OUTPUT   Urine(mL/kg/hr) 10   10   Emesis/NG output(mL/kg) 0(0)   0(0)   Drains(mL/kg) 130(1.4)   130(1.4)   Shift Total(mL/kg) 140(1.5)   140(1.5)   Weight (kg) 93.5 93.5 93.5 93.5       Wt Readings from Last 3 Encounters:   06/03/19 206 lb 2.1 oz (93.5 kg)        Body mass index is 33.27 kg/m².      Diet: Diet NPO Effective Now Exceptions are: Ice Chips, Sips with Meds, Popsicles        MEDS:     Scheduled Meds:   calcium gluconate IVPB 1.5 g Intravenous Once    rosuvastatin  40 mg Oral Nightly    [Held by provider] potassium chloride  10 mEq Oral Daily    [Held by provider] clopidogrel  75 mg Oral QAM    [Held by provider] aspirin  81 mg Oral Daily    sodium chloride flush  10 mL Intravenous 2 times per day    piperacillin-tazobactam  3.375 g Intravenous Q8H    potassium replacement protocol   Other RX Placeholder    insulin lispro  0-6 Units Subcutaneous Q6H    calcium replacement protocol   Other RX Placeholder    heparin (porcine)  5,000 Units Subcutaneous Q8H     Continuous Infusions:   norepinephrine 9 mcg/min (06/03/19 0514)    dextrose      IV infusion builder 125 mL/hr at 06/03/19 0230     PRN Meds:  sodium chloride flush 10 mL PRN   ondansetron 4 mg Q6H PRN   acetaminophen 650 mg Q4H PRN   potassium chloride 10 mEq PRN   magnesium sulfate 1 g PRN   oxyCODONE-acetaminophen 1 tablet Q4H PRN   Or     oxyCODONE-acetaminophen 2 tablet Q4H PRN   HYDROmorphone 0.5 mg Q3H PRN   glucose 15 g PRN   dextrose 12.5 g PRN   glucagon (rDNA) 1 mg PRN   dextrose 100 mL/hr PRN         PHYSICAL EXAM:     CONSTITUTIONAL: awake and a little confused  ABDOMEN: midline, incision    WOUND/INCISION:  healing well, no drainage, no erythema  EXTREMITY: no cyanosis, clubbing or edema      LABS:     CBC: Recent Labs     06/02/19 0322 06/02/19 2002 06/03/19  0346   WBC 23.0* 25.5* 23.7*   RBC 4.92 4.40 4.95   HGB 16.3* 14.6 16.9*   HCT 50.7* 46.1 54.8*   .0* 104.8* 110.7*   MCH 33.1* 33.2* 34.1*   MCHC 32.1* 31.7* 30.8*    288 248   MPV 10.0 9.8 10.0      Last 3 CMP:   Recent Labs     06/02/19 0322 06/02/19 1810 06/03/19  0346    137 136   K 5.0 4.8 4.9    106 104   CO2 16* 14* 14*   BUN 51* 58* 61*   CREATININE 2.5* 3.2* 3.8*   GLUCOSE 185* 174* 235*   CALCIUM 8.3* 7.2* 7.4*   PROT  --  5.1*  --    LABALBU  --  2.9*  --    BILITOT  --  0.4  --    ALKPHOS  --  44  --    AST  --  21  --    ALT  --  11  --       Troponin: No results for input(s): TROPONINI in the last 72 hours. Calcium:   Lab Results   Component Value Date    CALCIUM 7.4 06/03/2019    CALCIUM 7.2 06/02/2019    CALCIUM 8.3 06/02/2019      Ionized Calcium: No results found for: IONCA   Lipids: No results for input(s): CHOL, HDL in the last 72 hours. Invalid input(s): LDLCALCU  INR: No results for input(s): INR in the last 72 hours. Lactic Acid:   Lab Results   Component Value Date    LACTA 4.7 06/03/2019    LACTA 3.7 06/02/2019    LACTA 3.1 06/02/2019                  DVT prophylaxis: [] Lovenox                                 [] SCDs                                 [] SQ Heparin                                 [] Encourage ambulation, low risk for DVT, no chemical or                                      mechanical prophylaxis necessary              [] Already on Anticoagulation      RADIOLOGY:      none genevieve Parada M.D.  FACS  Electronically signed 6/3/2019 at 6:26 AM

## 2019-06-03 NOTE — PROGRESS NOTES
Palliative care eval received when pt was first admitted to  with abd pain. Pt is currently in the ICU, following surgery for cecal perforation and sepsis. Case discussed with pt's nurse,HILLARY Simon and Dr Courtney Mcintyre. Per their report, pt is not responding to max doses of levophed and vassopressin for BP support, pt requiring frequent fluid boluses. Pt is also having resp impairment, she is currently on 100% non rebreather, pt is also having increased confusion and hypothermia, requiring RADHA hugger. DR Courtney Mcintyre feels pt will decline to the point of needing intubated for resp support and dialysis. Discussion with family regarding any care limitations has not yet been done. Dr Courtney Mcintyre is \"okay\" with this nurse placing a call to pt's son Johanna Guerra to discuss condition update and code status. Call placed to pt's son, Johanna Guerra ( per pt's nurse,he is the contact for decisions) and pt's overall condition reviewed and writer explained options for care that are likely to be needed. Specifically, intubation and mechanical ventilation for resp support and dialysis for kidney support. Johanna Guerra immediately said, \"no breathing machines for Mom. \"  This nurse explained multi system failure with sepsis and that dialysis will not be able to be done \"fast enough\" to correct resp impairment without mechanical ventilator support, at least short term. All questions answered and Johanna Guerra states understanding. Johanna Guerra states that he doesn't think his mom would want it. Johanna Guerra states that he will contact the \"out of state\" kids to let them and and get their opinion. Johanna Guerra states that he will be here in \"about 2 hours\" to discuss with staff. We also discussed what should be done if pt needs acute intubation or CPR prior to family arriving, Johanna Guerra states, \" if she needs a breathing machine or her heart stops before we get there,  don't prolong anything, make sure she is not suffering and let her pass. \"    Writer repeated, to confirm, that if pt were to need intubation or resuscitation before family arrives, that is not to be done and \"allow natural death\". Jason Carroll states, \" yes, allow her to die\". Support given that staff does not think that will happen in the next few hours and family should be careful with driving. Jason Carroll states understanding and agreement, family will be in to follow up with physician. Jason Carroll states no further questions or needs at this time. Emotional support given. Rao Mcclelland and Dr Jodi Og updated on above conversation. Dr Jodi Og placed order for code status to be Limited,  \"no\" intubation, \"no\" CPR, \"no\" shock and \"no\" resuscitative meds. Palliative care will continue to follow.

## 2019-06-03 NOTE — SIGNIFICANT EVENT
Patient continues to deteriorate with progressive renal failure lactic acidosis and developing hyperkalemia. She is developing elevated respiratory rate secondary to severe acidosis. I called the patient's son, Obed Cohen. I talked with him regarding her critical status. I explained that in order to survive, she needs life support which includes mechanical ventilator support and dialysis. He was very adamant not to escalate care and that she was a DNR. He stated she would not want dialysis or mechanical ventilator support. I explained to him that she was 100% terminal without life support. He acknowledges this and still indicates not to intubate or initiate dialysis. I asked if we could start comfort measures and change her code status. Her code status is changed to a DNR CC. She will be placed on a morphine drip. Once family arrives, pressors will be discontinued. Electronically signed by Jaida Mendoza MD.

## 2019-06-03 NOTE — FLOWSHEET NOTE
06/03/19 1455   Encounter Summary   Services provided to: Patient   Referral/Consult From: Karley Lopez Visiting Yes  (6/3)   Complexity of Encounter Low   Length of Encounter 15 minutes   Routine   Assessment Sleeping     Patient was resting and not able to speak at the time of this visit. This staff offered prayer support for the patient and left a note on a prayer card for the family to notify us if further conversation was requested.

## 2019-06-03 NOTE — FLOWSHEET NOTE
Pt is in the ICU. Her  and one son are with her. Advance Directive Consult: Advance Directive materials were provided to patient and explained. Patient is not ready to complete at this time, gave information for Spiritual Care to be contacted if further assistance is needed. Follow up is needed as pt was not doing well last night and family may need our support. 06/03/19 0730   Encounter Summary   Services provided to: Patient and family together   Referral/Consult From: Nurse   Support System Spouse; Children;Family members   Continue Visiting Yes  (6/3 AD info)   Complexity of Encounter Moderate   Length of Encounter 15 minutes   Spiritual/Latter day   Type Spiritual support   Advance Directives (For Healthcare)   Healthcare Directive No, patient does not have an advance directive for healthcare treatment   Information on [place] Requested No   Patient Requests Assistance Yes, referral made to    Advance Directives Documents given;Documents explained;Pt. not interested at this time

## 2019-06-03 NOTE — OP NOTE
[address] [place] [address]                                OPERATIVE REPORT    PATIENT NAME: Bonnye Schwab                 :        1935  MED REC NO:   [id number]                           ROOM:       0014  ACCOUNT NO:   [de-identified]                           ADMIT DATE: 2019  PROVIDER:     Barbra Rodriguez. Franky Rodney M.D.    [place]:  2019    PREOPERATIVE DIAGNOSES:  Worsening clinical condition, worsening  acidosis and modeling, rule out intraabdominal catastrophe. 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 OPERATIONS PERFORMED:  1. Exploratory laparotomy. 2.  Right colectomy. 3.  Intraabdominal lavage. 4.  Placement of Irrisept. SURGEON:  Barbra Rodriguez. Franky Rodney MD    ANESTHESIA:  General.    BLOOD LOSS:  Less than 50. DRAINS:  23 Danny near the site of perforation. NARRATIVE:  The patient was brought to the operating room, underwent  general anesthesia. Timeout was taken. Abdomen was prepped with  DuraPrep. We started at the bottom of her previous incision and carried  around to the left of the umbilicus as that is where the incision kind  of headed anyways. Subcutaneous tissue was divided and got down to the  mesh and then started inferiorly away from the mesh and found the  midline, incised it. Entering the abdomen, there was a large amount of  ascitic fluid, non-purulent appearing that was evacuated, some was sent  for culture. Aspirated the ascites that was coming out of the incision  and then divided the previous mesh repair in her midline and the small  intestine was small. There were no signs of bowel obstruction. By  palpation along the right side, there was this mass over on the right  side and I was hoping that this was a ruptured appendix.   So, I extended  the incision some more, freed up moderate amount of adhesions and  visualized the cecum and there was an indurated inflammatory process on  the medial side right at the junction of the ileocecal valve. Rolling  it over, the appendix was not visualized, but it was not in the location  where the appendix definitely comes off and at this point, I did note  she had a perforated cecal diverticulum or something else. At this point, the right colon was mobilized by incising the white line  of Toldt. The terminal ileum was mobilized and the right colon was  mobilized including the hepatic flexure off the duodenum and upward. The cecum was hard and it was difficult to tell what the etiology of  this was and a site in the terminal ileum was chosen, transected with a  SALLY 75. A site to the right of the middle colic was chosen and was  transected with a SALLY 75. The ileocolic was ligated with 2-0 silk  suture ligature and the right colic was taken with clips. The rest of  the mesentery was taken with a Harmonic scalpel. The specimen was  removed from the field and at this point, a functional end-to-end  anastomosis was created stapling the terminal ileum antimesenteric  border to the tenia of the transverse colon and then, the end was closed  with a TA-60 green. The anastomosis was inspected prior to closure and  appeared to be hemostatic and intact. The mesenteric defect was closed  with interrupted 2-0 silk. Evicel was then squirted on the anastomosis  and allowed to solidify. We then checked the NG tube position and  checked it was in good position and then, the abdomen was irrigated and  then aspirated and then, a bottle of Irrisept was placed in the  abdominal cavity and allowed to dwell. A 19 Danny drain was placed near the site of the previous perforation  and left in place with clamps for one hour. NG tube position was  checked and was in the stomach. At this point, the midline was closed  with #1 Novafil.   The skin was closed with staples. The drain was  secured with 2-0 silk and the patient was brought back to the recovery  room and was planned for ICU in stable condition.         Krysta Denise M.D.    D: 06/02/2019 15:45:46       T: 06/02/2019 19:13:17     [email or web address]  Job#: [id number]     Doc#: [id number]    CC:

## 2019-06-03 NOTE — PLAN OF CARE
Problem: Restraint Use - Nonviolent/Non-Self-Destructive Behavior:  Goal: Absence of restraint-related injury  Description  Absence of restraint-related injury  Outcome: Met This Shift  Note:   Pt monitored at least hourly, sometimes more frequently     Problem: Restraint Use - Nonviolent/Non-Self-Destructive Behavior:  Goal: Absence of restraint indications  Description  Absence of restraint indications  Outcome: Not Met This Shift  Note:   Pt reaching at and pulling at [facility] [place] regardless of commands and explanations. Care plan reviewed with patients family. Patients family verbalizes understanding of the plan of care and contribute to goal setting.

## 2019-06-03 NOTE — PROGRESS NOTES
0830-Assessment completed at bedside. Family present. Patient slightly confused but able to follow all commands and requesting ice chips. Warm blankets applied to feet due to being cold and purple. On 6L NC at this time. 0855-Dr Jean Carlos Sequeira updated on the patient and BP 70s and levophed 30mcg/min, ordered to start vasopressin. 0900-Dr Perrin rounded on the patient, ordered to give fluid bolus 1L and to placed on NRB mask due to 82% on 6 L NC. Ordered labs for 1400. Updated Dr Jean Carlos Sequeira that unable to collect urine as ordered due to no UOP at this time. 0927-Patient on NRB mask, spo2 95%. Resting calmly in the bed at this time. 1115-Updated Dr Jean Carlos Sequeira that the patient's BP is in the 76s and unable to obtain a pusle ox, received roder for stat hemoglobin and 1L of LR fluid bolus. 1130-Updated Dr JeanC arlos Sequeira on the Hg 16.4, ordered decadron 10mg times 1 dose. 1400-Updated Dr Jean Carlos Sequeira on blood pressure remains low, still difficult to obtain a pulse oximetry at this time and remains on a NRB. Updated that when fluid boluses running oxygenation and blood pressure is better. Albumin ordered to give every 6 hours x4 doses. 1430-Rectal temp 96.1F, herson huggar placed on patient, she is attempting to pull it off. Patient follows commands but has periods of confusion. Patient now mottled on bilateral ears, lower extremities and groin. 1515-Reported critical lab values to Dr Jean Carlos Sequeira, he called the son and updated on the situation, the son agreed to make the patient a DNR-CC and they are on their way in.     1536-Sent a secure message via perfect serve to Dr Aj Yousif to updated on patient is now a DNR-CC    1615-Family present and Dr Jean Carlos Sequeira spoke to family at bedside. In agreement for [facility] code status. Removed NRB mask and placed on 2L NC for comfort. 1700-NG removed per family request. Dilaudid drip started, patient repositioned for comfort. Turning down pressors and family updated on this.  Patient is updated and in agreement to stop pressors. She states \" I Kashmir Sylvester go home and I have colon cancer don't I?\" Emotional support given to patient and family. 1736-Increased dilaudid drip due to dyspnea at this time. patient is no longer verbal. Family at bedside. 1750-No breath sounds or heart sounds heard at this time. Family at bedside and updated. House supervisor called. 1800-LOOP # [id number].  Ordered received to release the body from Zain Maria CNP

## 2019-06-03 NOTE — PLAN OF CARE
RN  Outcome: Ongoing  Note:   No redness or swelling noted at site. No signs of injury related to restraints. Will continue to monitor. Care plan reviewed with patient and family. Patient and family verbalize understanding of the plan of care and contribute to goal setting.

## 2019-06-03 NOTE — CARE COORDINATION
6/3/19, 9:45 AM      Kenneth Solis       Admitted from: [facility] from [facility] 6/2/2019/ [facility] day: 1   [facility] Reason for admit: Sepsis [facility]) [A41.9] Status: IP  Admit order signed?: yes  PMH:  has a past medical history of Arthritis, CAD (coronary artery disease), Cerebral artery occlusion with cerebral infarction ([address] and Hypertension. Procedure:   6/2 Exploratory lap; right colectomy; intraabdominal lavage; placement of Irrisept  6/3 CVC Left subclavian  6/3 CXR: New hazy appearance to the left chest. This could represent a layering pleural effusion versus atelectasis  Medications:  Scheduled Meds:   dexamethasone  2 mg Intravenous Daily    piperacillin-tazobactam  3.375 g Intravenous Q12H    lactated ringers bolus  1,000 mL Intravenous Once    rosuvastatin  40 mg Oral Nightly    [Held by provider] clopidogrel  75 mg Oral QAM    [Held by provider] aspirin  81 mg Oral Daily    sodium chloride flush  10 mL Intravenous 2 times per day    potassium replacement protocol   Other RX Placeholder    insulin lispro  0-6 Units Subcutaneous Q6H    calcium replacement protocol   Other RX Placeholder    heparin (porcine)  5,000 Units Subcutaneous Q8H     Continuous Infusions:   norepinephrine 30 mcg/min (06/03/19 0854)    lactated ringers 125 mL/hr at 06/03/19 0839    vasopressin (Septic Shock) infusion 0.04 Units/min (06/03/19 0915)    dextrose        Pertinent Info/Orders/Treatment Plan: Presented to [facility] with c/o 2 days of worsening abdominal pain, n/v, and diarrhea. CT at [facility] showed free fluid in abdomen. Transferred to [address] [place] [address]. Sarah. General Surgery consulted and was taken for surgery on 6/2 for perfed bowel; colectomy performed. Remains mottled. ANGELA x1. Was on Levo and Bicarb drips, on nasal cannula. Overnight, very confused, BP dropped and received albumin and fluids.  This morning, increased O2 needs and placed on NRB mask, SBP 50's - maxed on levo and

## 2019-06-04 LAB — MRSA SCREEN: NORMAL

## 2019-06-04 NOTE — DISCHARGE SUMMARY
Assessment and Plan:        1. Septic shock:   2. Hypotension:   3. Acute peritonitis:   4. Right colectomy:   5. Colon cancer:   6. Lactic acidosis:   7. Acute renal failure:   8. Hyperlipidemia:   9. Coronary artery disease:   10. Hyperglycemia:      CC:  Septic shock  HPI: Patient is an 82-year-old white female nonsmoker. She has a remote history of hysterectomy and cholecystectomy. She has coronary artery disease chronically treated with aspirin and Plavix. She has prior history of CVA and she has a history of hypertension. [facility] 2 to three-day history of nausea and abdominal pain[facility] CT scan of the abdomen and pelvis which revealed ascites. She had an elevated white blood cell count 22,000 and she had mucus-containing diarrhea. Patient was transferred to [facility] on 6/2/19. She was started on Zosyn and underwent stool studies for enteric pathogens. She was found to have enteropathogenic E. coli on PCR. She still had intermittent abdominal discomfort. She developed signs and symptoms of sepsis with peripheral mottling. Resultantly, she underwent exploratory laparotomy on 6/2/19. She was found to have a perforated cecal carcinoma on the medial wall associated with large amount of ascites. She did require right colectomy and intra-abdominal lavage. Post procedure, patient developed issues with tachycardia and hypotension. She was diagnosed with septic shock and treated supportively. Unfortunately, she continued to clinically deteriorate with progressive renal failure and progressive lactic acidosis. It became clear that she would require dialysis mechanical ventilator support. However, family and patient were absolutely opposed to life support. The patient did not feel that the effort was worth undertaking given that she already had cancer.   She repeatedly elected not to proceed with dialysis

## 2019-06-07 LAB
AEROBIC CULTURE: NORMAL
ANAEROBIC CULTURE: NORMAL
BLOOD CULTURE, ROUTINE: NORMAL
BLOOD CULTURE, ROUTINE: NORMAL
GRAM STAIN RESULT: NORMAL

## 2023-06-15 NOTE — CONSULTS
Assessment and Plan:        1. Septic shock: Secondary to perforated abdominal viscus with subsequent peritonitis. Patient required surgical intervention on 6/2/19. She is status post volume resuscitation. She requires pressors. Unable to initiate [facility] protocol due to the lack of availability of the medications. Short course of low-dose steroids. 2. Hypotension: Continue with pressors. 3. Acute peritonitis: Secondary to perforated abdominal viscus related to colon cancer. Continue with volume resuscitation and antibiotics. Patient on Zosyn. 4. Right colectomy: Secondary to colon cancer. Awaiting pathology. Completed 6/2/19. 5. Colon cancer: Awaiting surgical pathology interpretation. 6. Lactic acidosis: Secondary to poor perfusion and renal failure. Low urine output. Undergoing volume resuscitation. Awaiting repeat labs. 7. Acute renal failure: No hydronephrosis on CT scan abdomen and pelvis completed 6/1/19. No contrast exposure on CT scan. Secondary to septic shock. Obtain urinalysis. Obtain fractional excretion of sodium. Albumin to improve oncotic pressure. Pressors to improve endorgan perfusion. 8. Hyperlipidemia: Chronically on Crestor. 9. Coronary artery disease: Chronically on aspirin and Plavix. These medications are on hold secondary to surgery. Look to restart tomorrow. 10. Hyperglycemia: Siding scale insulin. CC:  Septic shock  HPI: Patient is an 77-year-old white female nonsmoker. She has a remote history of hysterectomy and cholecystectomy. She has coronary artery disease chronically treated with aspirin and Plavix. She has prior history of CVA and she has a history of hypertension. She presented to an outlying facility on 6/1/19 with a 2 to three-day history of nausea and abdominal pain. At the outlying facility, she underwent CT scan of the abdomen and pelvis which revealed ascites.   She had an elevated white blood cell count 22,000 and she had mucus-containing Med rec complete per pt.   diarrhea. Patient was transferred to [address] [place] [address] on 6/2/19. She was started on Zosyn and underwent stool studies for enteric pathogens. She was found to have enteropathogenic E. coli on PCR. She still had intermittent abdominal discomfort. She developed signs and symptoms of sepsis with peripheral mottling. Resultantly, she underwent exploratory laparotomy on 6/2/19. She was found to have a perforated cecal carcinoma on the medial wall associated with large amount of ascites. She did require right colectomy and intra-abdominal lavage. Post procedure, patient developed issues with tachycardia and hypotension. She was diagnosed with septic shock and treated supportively. ROS: Patient complains of thirst.  She complains of abdominal pain. She complains of nausea. She complains of intermittent hiccups. PMH:  Per HPI  SHX:  Lifetime nonsmoker  FHX: CAD  Allergies: Morphine  Medications:     norepinephrine 11 mcg/min (06/03/19 0653)    dextrose      IV infusion builder 125 mL/hr at 06/03/19 0230      calcium gluconate IVPB  1.5 g Intravenous Once    rosuvastatin  40 mg Oral Nightly    [Held by provider] potassium chloride  10 mEq Oral Daily    [Held by provider] clopidogrel  75 mg Oral QAM    [Held by provider] aspirin  81 mg Oral Daily    sodium chloride flush  10 mL Intravenous 2 times per day    piperacillin-tazobactam  3.375 g Intravenous Q8H    potassium replacement protocol   Other RX Placeholder    insulin lispro  0-6 Units Subcutaneous Q6H    calcium replacement protocol   Other RX Placeholder    heparin (porcine)  5,000 Units Subcutaneous Q8H     Vital Signs: T: 97.4 P: 105 RR: 27 B/P: 117/53: FiO2: 5 L: O2 Sat: 93%: I/O: 5039/990  CAM-ICU:   Negative. General:   Acutely ill-appearing white female. HEENT:  normocephalic and atraumatic. No scleral icterus. Neck: supple. No JVD. Lungs: clear to auscultation. No retractions  Cardiac: Sinus tachycardia  Abdomen: soft. Tender. Extremities:  No clubbing, cyanosis, or edema x 4. Vasculature: capillary refill < 3 seconds. Palpable dorsalis pedis pulse. Skin:  warm and dry. No rash. Psych:  Alert and oriented x3. Affect irritable. Lymph:  No supraclavicular adenopathy. Neurologic:  No focal deficit. No seizure activity. .      Data: (All radiographs, tracings, PFTs, and imaging are personally viewed and interpreted unless otherwise noted).  CT scan 6/1/19: Shows ascites. No free air within the abdomen. No bowel wall thickening.  Stool for GI pathogens: Positive for E. coli enteropathogenic.  Sodium 136, potassium 4.9, chloride 14, BUNs 61, creatinine 3.8, glucose 235. Lactic acid 4.7. Albumin 2.9. White blood cell count 23.7, hemoglobin 16.9, platelets 454.  Telemetry shows sinus tachycardia. Rate 106. Case discussed with Dr. Darrick Chowdhury. Time does not include procedures. CC time 40 minutes. Electronically signed by Aiden Beck M.D.

## (undated) DEVICE — PAD,ABDOMINAL,5"X9",ST,LF,25/BX: Brand: MEDLINE INDUSTRIES, INC.

## (undated) DEVICE — SPONGE GZ W4XL4IN COT 12 PLY TYP VII WVN C FLD DSGN

## (undated) DEVICE — LINER SUCT CANSTR 1500CC SEMI RIG W/ POR HYDROPHOBIC SHUT

## (undated) DEVICE — STAPLER INT 75MM CUT LN L73MM STPL LN L77MM LNAR B-FORM

## (undated) DEVICE — E-Z CLEAN, NON-STICK, PTFE COATED, ELECTROSURGICAL BLADE ELECTRODE, 6.5 INCH (16.5 CM): Brand: MEGADYNE

## (undated) DEVICE — GLOVE SURG SZ 65 THK91MIL LTX FREE SYN POLYISOPRENE

## (undated) DEVICE — RELOAD STPL L75MM OPN STPL H4.5MM CLS STPL H2MM WIRE

## (undated) DEVICE — DRAIN CHN 19FR L0.25IN DIA6.3MM SIL RND HUBLESS FULL FLUT

## (undated) DEVICE — TOTAL TRAY, DB, 100% SILI FOLEY, 16FR 10: Brand: MEDLINE

## (undated) DEVICE — Device

## (undated) DEVICE — SEALER TISS L20CM DIA13MM ADV BPLR L CRV JAW OPN APPRCH

## (undated) DEVICE — APPLIER CLP L L13IN TI MULT RNG HNDL 20 CLP STR LIGACLP

## (undated) DEVICE — 3M™ WARMING BLANKET, UPPER BODY, 10 PER CASE, 42268: Brand: BAIR HUGGER™

## (undated) DEVICE — STAPLER INT STPL LN H1.8-4.8XL60MM THCK TISS 2 ROW 8 FIRING

## (undated) DEVICE — GARMENT,MEDLINE,DVT,INT,CALF,MED, GEN2: Brand: MEDLINE

## (undated) DEVICE — EVACUATOR SURG 100CC SIL BLB SUCT RESVR FOR CLS WND DRNGE

## (undated) DEVICE — SPONGE DRN W4XL4IN RAYON/POLYESTER 6 PLY NONWOVEN PRECUT